# Patient Record
Sex: MALE | Race: WHITE | Employment: OTHER | URBAN - METROPOLITAN AREA
[De-identification: names, ages, dates, MRNs, and addresses within clinical notes are randomized per-mention and may not be internally consistent; named-entity substitution may affect disease eponyms.]

---

## 2019-06-03 ENCOUNTER — OFFICE VISIT (OUTPATIENT)
Dept: FAMILY MEDICINE CLINIC | Facility: CLINIC | Age: 84
End: 2019-06-03
Payer: MEDICARE

## 2019-06-03 VITALS
SYSTOLIC BLOOD PRESSURE: 170 MMHG | TEMPERATURE: 98 F | DIASTOLIC BLOOD PRESSURE: 62 MMHG | HEART RATE: 48 BPM | WEIGHT: 143.63 LBS | RESPIRATION RATE: 20 BRPM

## 2019-06-03 DIAGNOSIS — M25.471 ANKLE EDEMA, BILATERAL: Primary | ICD-10-CM

## 2019-06-03 DIAGNOSIS — R00.1 DECREASED HEART RATE: ICD-10-CM

## 2019-06-03 DIAGNOSIS — Z76.89 ENCOUNTER TO ESTABLISH CARE: ICD-10-CM

## 2019-06-03 DIAGNOSIS — M25.472 ANKLE EDEMA, BILATERAL: Primary | ICD-10-CM

## 2019-06-03 DIAGNOSIS — R01.1 SYSTOLIC MURMUR: ICD-10-CM

## 2019-06-03 DIAGNOSIS — I10 ESSENTIAL HYPERTENSION: ICD-10-CM

## 2019-06-03 PROCEDURE — 99203 OFFICE O/P NEW LOW 30 MIN: CPT | Performed by: FAMILY MEDICINE

## 2019-06-03 RX ORDER — FINASTERIDE 5 MG/1
5 TABLET, FILM COATED ORAL
COMMUNITY
End: 2021-10-28

## 2019-06-03 RX ORDER — ASPIRIN 81 MG/1
81 TABLET ORAL DAILY
COMMUNITY

## 2019-06-03 RX ORDER — ATORVASTATIN CALCIUM 40 MG/1
40 TABLET, FILM COATED ORAL NIGHTLY
COMMUNITY
End: 2021-10-28

## 2019-06-03 RX ORDER — LISINOPRIL 10 MG/1
10 TABLET ORAL
COMMUNITY
End: 2021-10-28

## 2019-06-03 NOTE — PROGRESS NOTES
HPI:    Patient ID: Shola Nguyen is a 80year old male. Patient presents with:  [facility] F/U: edema in feet/afib      HPI  Patient is here with his daughter to establish care. Has pmh significant for HTN, HLD and Afib.   He is here for heard.  Pulmonary/Chest: Effort normal and breath sounds normal.   Musculoskeletal: Normal range of motion. He exhibits no edema or tenderness. Neurological: He has normal strength. No sensory deficit.               ASSESSMENT/PLAN:     Sharyle Argue was seen to

## 2019-06-04 ENCOUNTER — TELEPHONE (OUTPATIENT)
Dept: FAMILY MEDICINE CLINIC | Facility: CLINIC | Age: 84
End: 2019-06-04

## 2019-06-04 NOTE — TELEPHONE ENCOUNTER
[facility] health PT Kaylah Ho because the pt's Hr is low- Heart Rate is 38  Vitals are  /64 O2 98% rr18  No signs of dyspnea, no light headed or dizziness- no edema      Last week HR on  5/29- was 58, and was  59 on 5/30  Weight 140 today  Just t

## 2019-09-20 ENCOUNTER — MED REC SCAN ONLY (OUTPATIENT)
Dept: FAMILY MEDICINE CLINIC | Facility: CLINIC | Age: 84
End: 2019-09-20

## 2020-01-23 ENCOUNTER — TELEPHONE (OUTPATIENT)
Dept: FAMILY MEDICINE CLINIC | Facility: CLINIC | Age: 85
End: 2020-01-23

## 2020-01-23 NOTE — TELEPHONE ENCOUNTER
DAUGHTER CALLED AND ADV THAT SHE NEEDS A NOTE TO BRING TO THE POST OFFICE EXPLAINING PTS CONDITIONS SO THAT THE MAIL PERSON CAN BRING THE MAIL UP TO THE DOOR.     PLEASE CALL AND ADV    THANK YOU

## 2021-04-28 ENCOUNTER — PATIENT OUTREACH (OUTPATIENT)
Dept: FAMILY MEDICINE CLINIC | Facility: CLINIC | Age: 86
End: 2021-04-28

## 2021-05-26 ENCOUNTER — PATIENT OUTREACH (OUTPATIENT)
Dept: FAMILY MEDICINE CLINIC | Facility: CLINIC | Age: 86
End: 2021-05-26

## 2021-07-28 ENCOUNTER — HOSPITAL ENCOUNTER (OUTPATIENT)
Age: 86
Discharge: HOME | End: 2021-07-28
Payer: MEDICARE

## 2021-07-28 VITALS
TEMPERATURE: 98 F | RESPIRATION RATE: 16 BRPM | HEART RATE: 78 BPM | SYSTOLIC BLOOD PRESSURE: 144 MMHG | OXYGEN SATURATION: 97 % | DIASTOLIC BLOOD PRESSURE: 89 MMHG

## 2021-07-28 DIAGNOSIS — R09.82 POSTNASAL DRIP: Primary | ICD-10-CM

## 2021-07-28 PROCEDURE — 99203 OFFICE O/P NEW LOW 30 MIN: CPT | Performed by: NURSE PRACTITIONER

## 2021-07-28 RX ORDER — CETIRIZINE HYDROCHLORIDE 5 MG/1
5 TABLET ORAL DAILY
Qty: 14 TABLET | Refills: 0 | Status: SHIPPED | OUTPATIENT
Start: 2021-07-28 | End: 2021-08-11

## 2021-07-28 RX ORDER — FLUTICASONE PROPIONATE 50 MCG
2 SPRAY, SUSPENSION (ML) NASAL DAILY
Qty: 16 G | Refills: 0 | Status: SHIPPED | OUTPATIENT
Start: 2021-07-28 | End: 2021-08-27

## 2021-08-06 ENCOUNTER — OFFICE VISIT (OUTPATIENT)
Dept: FAMILY MEDICINE CLINIC | Facility: CLINIC | Age: 86
End: 2021-08-06
Payer: MEDICARE

## 2021-08-06 VITALS
TEMPERATURE: 99 F | HEIGHT: 65 IN | WEIGHT: 153 LBS | DIASTOLIC BLOOD PRESSURE: 76 MMHG | HEART RATE: 71 BPM | BODY MASS INDEX: 25.49 KG/M2 | SYSTOLIC BLOOD PRESSURE: 122 MMHG | RESPIRATION RATE: 18 BRPM | OXYGEN SATURATION: 98 %

## 2021-08-06 DIAGNOSIS — H61.21 IMPACTED CERUMEN OF RIGHT EAR: ICD-10-CM

## 2021-08-06 DIAGNOSIS — H92.01 RIGHT EAR PAIN: ICD-10-CM

## 2021-08-06 DIAGNOSIS — R60.0 SWELLING OF RIGHT PAROTID GLAND: Primary | ICD-10-CM

## 2021-08-06 PROBLEM — I44.2 COMPLETE HEART BLOCK (HCC): Status: ACTIVE | Noted: 2019-09-18

## 2021-08-06 PROBLEM — Z95.0 PACEMAKER: Status: ACTIVE | Noted: 2019-09-18

## 2021-08-06 PROBLEM — I48.91 ATRIAL FIBRILLATION WITH SLOW VENTRICULAR RESPONSE (HCC): Status: ACTIVE | Noted: 2019-06-05

## 2021-08-06 PROBLEM — I10 ESSENTIAL HYPERTENSION: Status: ACTIVE | Noted: 2019-06-05

## 2021-08-06 PROCEDURE — 99213 OFFICE O/P EST LOW 20 MIN: CPT | Performed by: FAMILY MEDICINE

## 2021-08-06 RX ORDER — AMOXICILLIN AND CLAVULANATE POTASSIUM 875; 125 MG/1; MG/1
1 TABLET, FILM COATED ORAL 2 TIMES DAILY
Qty: 14 TABLET | Refills: 0 | Status: SHIPPED
Start: 2021-08-06 | End: 2021-08-09

## 2021-08-06 NOTE — PROGRESS NOTES
New patient here for right ear pain x 2 weeks. No discharge. Patient states he has seasonal allergies. Runny nose. No cough/fever.

## 2021-08-06 NOTE — PATIENT INSTRUCTIONS
US of parotid ordered - to evaluate swelling     Rx Augmentin 875 mg twice daily X 7 days to cover for the possibility of infection     Warm compresses at least 3 times per day     If any worsening - fever/ chills / nausea or vomiting GO TO THE ER IMMEDI floss your teeth daily. See your dentist for regular cleanings. Follow-up care  Follow up with your healthcare provider, or as advised. See your healthcare provider for further exams and testing.  If you have been referred to a specialist, make an appoin

## 2021-08-07 NOTE — PROGRESS NOTES
[address] [facility] Family Medicine Office Note  Chief Complaint:   Patient presents with:  New Patient  Ear Pain      HPI:   This is a 80year old male coming in with complaints of R ear discomfort (pointing to the area around the R ear ).   [place] but this following:    Height as of this encounter: 5' 5\" (1.651 m). Weight as of this encounter: 153 lb (69.4 kg). Vital signs reviewed. Appears stated age, well groomed.   Physical Exam     GEN: Not in any acute distress, making good conversation, answering a tablet 0     Sig: Take 1 tablet by mouth 2 (two) times daily for 7 days.        Health Maintenance:  Annual Physical Never done  Zoster Vaccines(1 of 2) Never done  Pneumococcal Vaccine: 65+ Years(1 of 1 - PPSV23) Never done  Annual Depression Screen due on

## 2021-08-09 ENCOUNTER — TELEPHONE (OUTPATIENT)
Dept: FAMILY MEDICINE CLINIC | Facility: CLINIC | Age: 86
End: 2021-08-09

## 2021-08-09 RX ORDER — AMOXICILLIN AND CLAVULANATE POTASSIUM 875; 125 MG/1; MG/1
1 TABLET, FILM COATED ORAL 2 TIMES DAILY
Qty: 14 TABLET | Refills: 0 | Status: SHIPPED | OUTPATIENT
Start: 2021-08-09 | End: 2021-08-16

## 2021-08-09 NOTE — TELEPHONE ENCOUNTER
Patient's EC, Nenita Baker, advised of note below. She verbalized understanding. No further questions at this time.

## 2021-08-09 NOTE — TELEPHONE ENCOUNTER
E-Prescribing Status: Transmission to pharmacy failed (8/6/2021  1:15 PM CDT)      Resend order per protocol  E-Prescribing Status: Receipt confirmed by pharmacy (8/9/2021  9:23 AM CDT)

## 2021-08-09 NOTE — TELEPHONE ENCOUNTER
DAUGHTER CALLED AND ADV THAT SHE THOUGHT THAT THERE WAS SOME MEDICATION THAT WAS SUPPOSE TO BE SENT OVER ON Friday.     LOOKS LIKE SCRIPT NEVER WENT THROUGH--FAILED    amoxicillin-pot clavulanate 875-125 MG Oral Tab    [facility]     THANK YOU

## 2021-08-16 ENCOUNTER — TELEPHONE (OUTPATIENT)
Dept: FAMILY MEDICINE CLINIC | Facility: CLINIC | Age: 86
End: 2021-08-16

## 2021-08-16 DIAGNOSIS — R60.0 SWELLING OF RIGHT PAROTID GLAND: Primary | ICD-10-CM

## 2021-08-16 NOTE — TELEPHONE ENCOUNTER
Sowmya Dhaliwal MD [address] [place] [address] Dr Nato Hernández          Patient's dtr, Boston Sharp, advised of contact information above. She verbalized understanding. No further questions at this time.

## 2021-08-16 NOTE — TELEPHONE ENCOUNTER
I see that he has his [address] scheduled for 8/19/2021, this will help us delineate this further. The concern was infection of the salivary glad vs. Obstructed salivary gland vs. The possibly of this being a tumor.  At this time, considering there is no improveme

## 2021-08-16 NOTE — TELEPHONE ENCOUNTER
Daughter called to ask what she should do. Lola Diehl was given an abx for a swollen jaw. They jaw has not improved and Lola Diehl has finished the abx. Rosa Clifton would like to know if Dr Radha Saleh wants to renew the abx or wait until [facility] visit to address.     Floyd

## 2021-08-16 NOTE — TELEPHONE ENCOUNTER
Patient's dtr, Michelle Vanessa, advised of Doctor's note below. She verbalized understanding and is agreeable for ENT referral. Pt does not have history seeing ENT, would prefer ENT in [facility] or [facility]. Referral Order pending, please review.  Thank luisito

## 2021-08-19 ENCOUNTER — HOSPITAL ENCOUNTER (OUTPATIENT)
Dept: ULTRASOUND IMAGING | Age: 86
Discharge: HOME | End: 2021-08-19
Attending: FAMILY MEDICINE
Payer: MEDICARE

## 2021-08-19 ENCOUNTER — TELEPHONE (OUTPATIENT)
Dept: FAMILY MEDICINE CLINIC | Facility: CLINIC | Age: 86
End: 2021-08-19

## 2021-08-19 DIAGNOSIS — R60.0 SWELLING OF RIGHT PAROTID GLAND: ICD-10-CM

## 2021-08-19 PROCEDURE — 76536 US EXAM OF HEAD AND NECK: CPT | Performed by: FAMILY MEDICINE

## 2021-08-19 NOTE — TELEPHONE ENCOUNTER
----- Message from Geno Hewitt MD sent at 8/19/2021  3:51 PM CDT -----  Called patient's daughter Selin Vergara [facility]) and provided results of the [address] and the need to see ENT asap.  She v/u but informed me that she has an appt to see

## 2021-08-20 ENCOUNTER — TELEPHONE (OUTPATIENT)
Dept: FAMILY MEDICINE CLINIC | Facility: CLINIC | Age: 86
End: 2021-08-20

## 2021-08-20 NOTE — TELEPHONE ENCOUNTER
Realizing that I told Inocencia Echols, patient’s daughter, that he could get his MAW visit done after we figured out details regarding the parotid mass (when she asked me if it was necessary to “follow up tomorrow”).  Please call them and reschedule (appt at 9:40) th

## 2021-08-20 NOTE — TELEPHONE ENCOUNTER
Patient's dtr, Alexx Yusuf, advised of Doctor's note below. She verbalized understanding. No further questions at this time. Future appt made.     Future Appointments   Date Time Provider Rachel Sood   9/13/2021 10:40 AM Christopher Panchal MD [facility]

## 2021-08-28 ENCOUNTER — TELEPHONE (OUTPATIENT)
Dept: FAMILY MEDICINE CLINIC | Facility: CLINIC | Age: 86
End: 2021-08-28

## 2021-08-28 NOTE — TELEPHONE ENCOUNTER
Patient's dtr, Gerhard Harrington, advised of Doctor's note below. She verbalized understanding. No further questions at this time.

## 2021-08-28 NOTE — TELEPHONE ENCOUNTER
Pt's dtr, [place]  Reports that pt is c/o discomfort to right jaw  Inquiring what can be done for swelling and discomfort at this time    She reports pt is c/o discomfort bothering his right ear little bit  She reports pt takes tylenol as needed and last segundo

## 2021-08-28 NOTE — TELEPHONE ENCOUNTER
If in that much pain he should take Tylenol 650 mg every 6-8 hours and taking this schedule, with alternating warm and cold compresses to help with pain. ~ MM

## 2021-08-28 NOTE — TELEPHONE ENCOUNTER
Daughter Mattie Sullivan called pt has some swelling in jaw pt has pain would like a call back     Can be reached at [phone]    Thank you

## 2021-08-30 ENCOUNTER — LAB ENCOUNTER (OUTPATIENT)
Dept: LAB | Age: 86
End: 2021-08-30
Attending: OTOLARYNGOLOGY
Payer: MEDICARE

## 2021-08-30 ENCOUNTER — HOSPITAL ENCOUNTER (OUTPATIENT)
Dept: CT IMAGING | Age: 86
Discharge: HOME | End: 2021-08-30
Attending: OTOLARYNGOLOGY
Payer: MEDICARE

## 2021-08-30 DIAGNOSIS — Z01.818 PREOP EXAMINATION: ICD-10-CM

## 2021-08-30 DIAGNOSIS — K11.8 MASS OF RIGHT PAROTID GLAND: ICD-10-CM

## 2021-08-30 DIAGNOSIS — Z11.59 ENCOUNTER FOR SCREENING FOR OTHER VIRAL DISEASES: ICD-10-CM

## 2021-08-30 LAB — CREAT BLD-MCNC: 1.2 MG/DL

## 2021-08-30 PROCEDURE — 70491 CT SOFT TISSUE NECK W/DYE: CPT | Performed by: OTOLARYNGOLOGY

## 2021-08-30 PROCEDURE — 82565 ASSAY OF CREATININE: CPT

## 2021-08-30 NOTE — PROGRESS NOTES
CT demonstrated the mass. As we discussed in the office, it is very suspicious for cancer. Proceed with FNA.  Should be scheduled already, if not please assist

## 2021-09-01 LAB — SARS-COV-2 RNA RESP QL NAA+PROBE: NOT DETECTED

## 2021-09-02 ENCOUNTER — NURSE ONLY (OUTPATIENT)
Dept: LAB | Facility: HOSPITAL | Age: 86
End: 2021-09-02
Attending: OTOLARYNGOLOGY
Payer: MEDICARE

## 2021-09-02 ENCOUNTER — HOSPITAL ENCOUNTER (OUTPATIENT)
Dept: ULTRASOUND IMAGING | Facility: HOSPITAL | Age: 86
Discharge: HOME | End: 2021-09-02
Attending: OTOLARYNGOLOGY
Payer: MEDICARE

## 2021-09-02 DIAGNOSIS — K11.8 MASS OF RIGHT PAROTID GLAND: ICD-10-CM

## 2021-09-02 PROCEDURE — 88305 TISSUE EXAM BY PATHOLOGIST: CPT | Performed by: OTOLARYNGOLOGY

## 2021-09-02 PROCEDURE — 88341 IMHCHEM/IMCYTCHM EA ADD ANTB: CPT | Performed by: OTOLARYNGOLOGY

## 2021-09-02 PROCEDURE — 76942 ECHO GUIDE FOR BIOPSY: CPT | Performed by: OTOLARYNGOLOGY

## 2021-09-02 PROCEDURE — 88342 IMHCHEM/IMCYTCHM 1ST ANTB: CPT | Performed by: OTOLARYNGOLOGY

## 2021-09-02 PROCEDURE — 42400 BIOPSY OF SALIVARY GLAND: CPT | Performed by: OTOLARYNGOLOGY

## 2021-09-02 NOTE — PROCEDURES
[facility]  Procedure Note    Mayra Alegria Patient Status:  Outpatient    3/24/1926 MRN [id number]   Location [address] [place] [address] Attending Damaris Huntley MD   Hosp Day # 0 PCP Geno Key MD     Procedure: US guided pa

## 2021-09-08 ENCOUNTER — TELEPHONE (OUTPATIENT)
Dept: FAMILY MEDICINE CLINIC | Facility: CLINIC | Age: 86
End: 2021-09-08

## 2021-09-08 NOTE — TELEPHONE ENCOUNTER
Geovanny of Dr. Manjarrez Janett office called to notify PCP's office of the following:    Dr. Nikko Wyatt (ENT) placed orders for referral Radiation Oncology, order for PET scan, and referral order for Dr. Crystal Grimaldo of [facility] for second opinion.     She repor

## 2021-09-08 NOTE — PROGRESS NOTES
Spoke to daughter and gave her numbers to radiology(for disc and pet scan), oncology(for appt) and for Dr. Joan Silva (for appt. Also entered referral for Dr. Joan Silva. Sherin Neumann

## 2021-09-08 NOTE — PROGRESS NOTES
Discussed with patient and daughter on phone yesterday. 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 [place], please assist in scheduling.      Adrianna, I would also like patient to get second opinion on treatment wit

## 2021-09-13 ENCOUNTER — HOSPITAL ENCOUNTER (OUTPATIENT)
Age: 86
Discharge: HOME | End: 2021-09-13
Payer: MEDICARE

## 2021-09-13 ENCOUNTER — TELEPHONE (OUTPATIENT)
Dept: FAMILY MEDICINE CLINIC | Facility: CLINIC | Age: 86
End: 2021-09-13

## 2021-09-13 VITALS
OXYGEN SATURATION: 96 % | TEMPERATURE: 98 F | HEART RATE: 82 BPM | DIASTOLIC BLOOD PRESSURE: 83 MMHG | SYSTOLIC BLOOD PRESSURE: 149 MMHG | RESPIRATION RATE: 16 BRPM

## 2021-09-13 DIAGNOSIS — S51.802A OPEN WOUND OF LEFT FOREARM, INITIAL ENCOUNTER: Primary | ICD-10-CM

## 2021-09-13 DIAGNOSIS — H02.401 PTOSIS OF RIGHT EYELID: ICD-10-CM

## 2021-09-13 PROCEDURE — 99204 OFFICE O/P NEW MOD 45 MIN: CPT | Performed by: PHYSICIAN ASSISTANT

## 2021-09-13 RX ORDER — TETRACAINE HYDROCHLORIDE 5 MG/ML
1 SOLUTION OPHTHALMIC ONCE
Status: COMPLETED | OUTPATIENT
Start: 2021-09-13 | End: 2021-09-13

## 2021-09-13 NOTE — TELEPHONE ENCOUNTER
Daughter called wanting to get her father in for an appointment this week if possible. Cut on left arm, may be infected.     Please advise- thank you

## 2021-09-13 NOTE — TELEPHONE ENCOUNTER
Pt's dtr, Belakshmi Parents reports cut to left arm, forearm - from metal screen door at house  She reports as \"swollen,\" \"not healing well\"   She reports little spot that could be infected, yellow - maybe pus  Nothing was really oozing, but was covered with bright

## 2021-09-13 NOTE — ED PROVIDER NOTES
Patient Seen in: Immediate [address] [place] [address]      History   Patient presents with:  Cellulitis    Stated Complaint: left arm lac    Subjective:   HPI    27-year-old male presents the immediate care for concerns about infection.   Patient states 1 week ago the s 149/83   Pulse 82   Resp 16   Temp 97.8 °F (36.6 °C)   Temp src Temporal   SpO2 96 %   O2 Device        Current:/83   Pulse 82   Temp 97.8 °F (36.6 °C) (Temporal)   Resp 16   SpO2 96%         Physical Exam  Vitals and nursing note reviewed.    Constit or sleep the area where he sees the swelling would be the dependent area. I do believe that the skin tear caused the excess swelling to go to the palmar side of his arm and it is collecting in his skin, essentially third spacing in the arm.  Patient was con

## 2021-09-13 NOTE — TELEPHONE ENCOUNTER
Patient will likely need to be seen in immediate care today. Follow up with me to assess response to antibiotics in the next few days.  ~ MM

## 2021-09-13 NOTE — TELEPHONE ENCOUNTER
Patient's dtr, Bren Young, advised of Doctor's note below. She verbalized understanding, asking if [facility] okay - she was advised that they may try [facility] first, and if needed- they would send to . She v/u.

## 2021-09-13 NOTE — ED INITIAL ASSESSMENT (HPI)
Pt cut his left forearm on a metal screen door last week. Wound appears red and has dried yellow drainage in it. Area is tender.

## 2021-09-20 ENCOUNTER — TELEPHONE (OUTPATIENT)
Dept: FAMILY MEDICINE CLINIC | Facility: CLINIC | Age: 86
End: 2021-09-20

## 2021-09-20 NOTE — TELEPHONE ENCOUNTER
Future Appointments   Date Time Provider Rachel Sood   9/22/2021 12:40 PM Geno Reed MD [facility] Vamsi Stack

## 2021-09-20 NOTE — TELEPHONE ENCOUNTER
A patient has a laceration on the right forearm for a few weeks. The patient did not believe it was healing right so he went to urgent care on 9/13/21. The patient did not receive stitches or medication and was told it looked like it was healing well.    Pa

## 2021-09-22 ENCOUNTER — OFFICE VISIT (OUTPATIENT)
Dept: FAMILY MEDICINE CLINIC | Facility: CLINIC | Age: 86
End: 2021-09-22
Payer: MEDICARE

## 2021-09-22 VITALS
HEIGHT: 65 IN | DIASTOLIC BLOOD PRESSURE: 72 MMHG | WEIGHT: 154.5 LBS | TEMPERATURE: 98 F | SYSTOLIC BLOOD PRESSURE: 120 MMHG | OXYGEN SATURATION: 98 % | HEART RATE: 76 BPM | RESPIRATION RATE: 18 BRPM | BODY MASS INDEX: 25.74 KG/M2

## 2021-09-22 DIAGNOSIS — H01.002 BLEPHARITIS OF RIGHT LOWER EYELID, UNSPECIFIED TYPE: ICD-10-CM

## 2021-09-22 DIAGNOSIS — T14.8XXA FRICTION BLISTER: ICD-10-CM

## 2021-09-22 DIAGNOSIS — Z51.89 VISIT FOR WOUND CHECK: Primary | ICD-10-CM

## 2021-09-22 PROCEDURE — 99213 OFFICE O/P EST LOW 20 MIN: CPT | Performed by: FAMILY MEDICINE

## 2021-09-22 RX ORDER — CIPROFLOXACIN HYDROCHLORIDE 3.5 MG/ML
2 SOLUTION/ DROPS TOPICAL EVERY 8 HOURS
Qty: 5 ML | Refills: 0 | Status: SHIPPED | OUTPATIENT
Start: 2021-09-22 | End: 2021-09-29

## 2021-09-22 NOTE — PROGRESS NOTES
[facility] Family Medicine Office Note  Chief Complaint:   Patient presents with: Follow - Up: IC on 9/13/21. Laceration/Eye issue.       HPI:   This is a 80year old male coming in for [address] / LACERATION OF THE L forearm   R ey conversation, answering appropriately   SKIN: No pallor, no erythema, no cyanosis, warm and dry, picture of L forearm as below. No swelling and no signs of cellulitis at this time.    Eyes: wnl, normal conjunctiva, R eye lower eyelid eversion and erythema, 06/03/2020    Patient/Caregiver Education: Patient/Caregiver Education: There are no barriers to learning. Medical education done. Outcome: Patient verbalizes understanding.  Patient is notified to call with any questions, complications, allergies, or wor

## 2021-09-22 NOTE — PROGRESS NOTES
81 yo male here for f/u IC on 9/13/21. Laceration to left are. Patient states arm healing well, a bump has formed under the skin. No pain to touch (bump). Eye issue has not resolved. Patient states he feels like he has something in it.  He is not doing elaine

## 2021-09-24 PROBLEM — C76.0 SQUAMOUS CELL CARCINOMA OF HEAD AND NECK (HCC): Status: ACTIVE | Noted: 2021-09-24

## 2021-09-28 ENCOUNTER — ORDER TRANSCRIPTION (OUTPATIENT)
Dept: ADMINISTRATIVE | Facility: HOSPITAL | Age: 86
End: 2021-09-28

## 2021-09-28 DIAGNOSIS — Z01.818 PREOP EXAMINATION: Primary | ICD-10-CM

## 2021-09-28 DIAGNOSIS — Z11.59 ENCOUNTER FOR SCREENING FOR OTHER VIRAL DISEASES: ICD-10-CM

## 2021-10-02 ENCOUNTER — LAB ENCOUNTER (OUTPATIENT)
Dept: LAB | Age: 86
End: 2021-10-02
Attending: RADIOLOGY
Payer: MEDICARE

## 2021-10-02 DIAGNOSIS — Z11.59 ENCOUNTER FOR SCREENING FOR OTHER VIRAL DISEASES: ICD-10-CM

## 2021-10-02 DIAGNOSIS — Z01.818 PREOP EXAMINATION: ICD-10-CM

## 2021-10-05 ENCOUNTER — HOSPITAL ENCOUNTER (OUTPATIENT)
Dept: GENERAL RADIOLOGY | Facility: HOSPITAL | Age: 86
Discharge: HOME | End: 2021-10-05
Attending: RADIOLOGY
Payer: MEDICARE

## 2021-10-05 DIAGNOSIS — C76.0 SQUAMOUS CELL CARCINOMA OF HEAD AND NECK (HCC): ICD-10-CM

## 2021-10-05 PROCEDURE — 92611 MOTION FLUOROSCOPY/SWALLOW: CPT

## 2021-10-05 PROCEDURE — 74230 X-RAY XM SWLNG FUNCJ C+: CPT | Performed by: RADIOLOGY

## 2021-10-05 NOTE — PROGRESS NOTES
ADULT VIDEOFLUOROSCOPIC SWALLOWING STUDY    Admission Date: 10/5/2021  Evaluation Date: 10/05/21  Radiologist: Dr. Shelly Hanson: Soft/ Easy to chew  Diet Recommendations - Liquids:  Thin Liquids  Compensatory Strat function. THIN LIQUIDS  Method of Presentation: Teaspoon; Cup; Straw  Oral Phase of Swallow (VFSS - Thin Liquids): Impaired  Bolus Retrieval (VFSS - Thin Liquids): Intact  Bilabial Seal (VFSS - Thin Liquids):  Impaired  Bolus Formation (VFSS - Thin Liqui outpatient dysphagia therapy       Thank you for your referral.   If you have any questions, please contact Dahlia Noyola, [address] [facility], [facility] [address] CCC-SLP/L, pager [phone]  Speech-LanguagePathologist

## 2021-10-28 ENCOUNTER — OFFICE VISIT (OUTPATIENT)
Dept: FAMILY MEDICINE CLINIC | Facility: CLINIC | Age: 86
End: 2021-10-28
Payer: MEDICARE

## 2021-10-28 VITALS
TEMPERATURE: 99 F | RESPIRATION RATE: 16 BRPM | DIASTOLIC BLOOD PRESSURE: 62 MMHG | OXYGEN SATURATION: 97 % | HEART RATE: 60 BPM | SYSTOLIC BLOOD PRESSURE: 118 MMHG

## 2021-10-28 DIAGNOSIS — Z00.00 ENCOUNTER FOR ANNUAL HEALTH EXAMINATION: Primary | ICD-10-CM

## 2021-10-28 PROCEDURE — 80053 COMPREHEN METABOLIC PANEL: CPT | Performed by: NURSE PRACTITIONER

## 2021-10-28 PROCEDURE — 85025 COMPLETE CBC W/AUTO DIFF WBC: CPT | Performed by: NURSE PRACTITIONER

## 2021-10-28 PROCEDURE — 80061 LIPID PANEL: CPT | Performed by: NURSE PRACTITIONER

## 2021-10-28 PROCEDURE — G0439 PPPS, SUBSEQ VISIT: HCPCS | Performed by: NURSE PRACTITIONER

## 2021-10-28 RX ORDER — ATORVASTATIN CALCIUM 40 MG/1
40 TABLET, FILM COATED ORAL NIGHTLY
Qty: 90 TABLET | Refills: 1 | Status: SHIPPED | OUTPATIENT
Start: 2021-10-28

## 2021-10-28 RX ORDER — FINASTERIDE 5 MG/1
5 TABLET, FILM COATED ORAL DAILY
Qty: 90 TABLET | Refills: 1 | Status: SHIPPED | OUTPATIENT
Start: 2021-10-28

## 2021-10-28 RX ORDER — LISINOPRIL 10 MG/1
10 TABLET ORAL DAILY
Qty: 90 TABLET | Refills: 1 | Status: SHIPPED | OUTPATIENT
Start: 2021-10-28

## 2021-10-28 RX ORDER — ACETAMINOPHEN 160 MG
2000 TABLET,DISINTEGRATING ORAL DAILY
COMMUNITY

## 2021-10-28 NOTE — PROGRESS NOTES
HPI:   Salvador Villalobos is a 80year old male who presents for a Medicare Initial Annual Wellness visit (Once after 12 month Medicare anniversary) . Lives alone  Here with merry his daughter   Dr. Adler Standard- cardiologist follows up once a year.  Has a pa functional status. Difficulty walking?: Yes   He has problems with Daily Activities based on screening of functional status. Problems with daily activities? : Yes   He has problems with Memory based on screening of functional status. Memory Problems? daily.  lisinopril 10 MG Oral Tab, Take 10 mg by mouth. finasteride 5 MG Oral Tab, Take 5 mg by mouth. atorvastatin 40 MG Oral Tab, Take 40 mg by mouth nightly. aspirin 81 MG Oral Tab, Take 81 mg by mouth daily.        MEDICAL INFORMATION:   He  has a pa Acuity: No (unable to do due to wheelchair, and problems with both eyes)      General Appearance:  Alert, cooperative, no distress, appears stated age, he is in a wheelchair    Head:  Normocephalic, right side cheek with some swelling.  Doing radiation endy poor?: No  How does the patient maintain a good energy level?: Other  How would you describe your daily physical activity?: Light  How would you describe your current health state?: Poor  How do you maintain positive mental well-being?: Visiting Family preventive care reminders to display for this patient.    Immunizations    Influenza Covered once per flu season  Please get every year -  Influenza Vaccine(1) due on 10/01/2021    Pneumococcal Each vaccine ([id number] & [id number]) covered once after 65 Pr

## 2021-10-28 NOTE — PATIENT INSTRUCTIONS
[facility] SCREENING SCHEDULE   Tests on this list are recommended by your physician but may not be covered, or covered at this frequency, by your insurer. Please check with your insurance carrier before scheduling to verify coverage.    PREVEN 09/27/2012     Pneumococcal Vaccination(1 of 4 - PCV13) Never done    Hepatitis B One screening covered for patients with certain risk factors   -  No recommendations at this time    Tetanus Toxoid Not covered by Medicare Part B unless medically necessary

## 2021-11-10 ENCOUNTER — APPOINTMENT (OUTPATIENT)
Dept: CT IMAGING | Facility: HOSPITAL | Age: 86
DRG: 196 | End: 2021-11-10
Attending: EMERGENCY MEDICINE
Payer: MEDICARE

## 2021-11-10 ENCOUNTER — HOSPITAL ENCOUNTER (OUTPATIENT)
Age: 86
Discharge: ANOTHER HOSPITAL | DRG: 196 | End: 2021-11-10
Payer: MEDICARE

## 2021-11-10 ENCOUNTER — HOSPITAL ENCOUNTER (INPATIENT)
Facility: HOSPITAL | Age: 86
LOS: 3 days | Discharge: HOME | DRG: 196 | End: 2021-11-13
Attending: EMERGENCY MEDICINE | Admitting: STUDENT IN AN ORGANIZED HEALTH CARE EDUCATION/TRAINING PROGRAM
Payer: MEDICARE

## 2021-11-10 ENCOUNTER — APPOINTMENT (OUTPATIENT)
Dept: GENERAL RADIOLOGY | Facility: HOSPITAL | Age: 86
DRG: 196 | End: 2021-11-10
Attending: EMERGENCY MEDICINE
Payer: MEDICARE

## 2021-11-10 VITALS
TEMPERATURE: 99 F | HEART RATE: 64 BPM | DIASTOLIC BLOOD PRESSURE: 71 MMHG | RESPIRATION RATE: 32 BRPM | OXYGEN SATURATION: 95 % | SYSTOLIC BLOOD PRESSURE: 136 MMHG

## 2021-11-10 DIAGNOSIS — I50.9 ACUTE ON CHRONIC CONGESTIVE HEART FAILURE, UNSPECIFIED HEART FAILURE TYPE (HCC): Primary | ICD-10-CM

## 2021-11-10 DIAGNOSIS — R06.00 DYSPNEA, UNSPECIFIED TYPE: ICD-10-CM

## 2021-11-10 DIAGNOSIS — R06.00 DYSPNEA, UNSPECIFIED TYPE: Primary | ICD-10-CM

## 2021-11-10 DIAGNOSIS — C76.0 SQUAMOUS CELL CARCINOMA OF HEAD AND NECK (HCC): ICD-10-CM

## 2021-11-10 DIAGNOSIS — R06.82 TACHYPNEA: ICD-10-CM

## 2021-11-10 DIAGNOSIS — Z11.52 ENCOUNTER FOR SCREENING FOR COVID-19: ICD-10-CM

## 2021-11-10 PROBLEM — R73.9 HYPERGLYCEMIA: Status: ACTIVE | Noted: 2021-11-10

## 2021-11-10 PROBLEM — D64.9 ANEMIA: Status: ACTIVE | Noted: 2021-11-10

## 2021-11-10 PROBLEM — R79.89 AZOTEMIA: Status: ACTIVE | Noted: 2021-11-10

## 2021-11-10 PROBLEM — E87.1 HYPONATREMIA: Status: ACTIVE | Noted: 2021-11-10

## 2021-11-10 PROCEDURE — 99223 1ST HOSP IP/OBS HIGH 75: CPT | Performed by: STUDENT IN AN ORGANIZED HEALTH CARE EDUCATION/TRAINING PROGRAM

## 2021-11-10 PROCEDURE — 71045 X-RAY EXAM CHEST 1 VIEW: CPT | Performed by: EMERGENCY MEDICINE

## 2021-11-10 PROCEDURE — 93000 ELECTROCARDIOGRAM COMPLETE: CPT | Performed by: PHYSICIAN ASSISTANT

## 2021-11-10 PROCEDURE — 99205 OFFICE O/P NEW HI 60 MIN: CPT | Performed by: PHYSICIAN ASSISTANT

## 2021-11-10 PROCEDURE — U0002 COVID-19 LAB TEST NON-CDC: HCPCS | Performed by: PHYSICIAN ASSISTANT

## 2021-11-10 PROCEDURE — 71275 CT ANGIOGRAPHY CHEST: CPT | Performed by: EMERGENCY MEDICINE

## 2021-11-10 RX ORDER — AMOXICILLIN AND CLAVULANATE POTASSIUM 400; 57 MG/5ML; MG/5ML
880 POWDER, FOR SUSPENSION ORAL 2 TIMES DAILY
COMMUNITY
Start: 2021-11-07 | End: 2021-11-16

## 2021-11-10 RX ORDER — ONDANSETRON 2 MG/ML
4 INJECTION INTRAMUSCULAR; INTRAVENOUS EVERY 6 HOURS PRN
Status: DISCONTINUED | OUTPATIENT
Start: 2021-11-10 | End: 2021-11-13

## 2021-11-10 RX ORDER — FINASTERIDE 5 MG/1
5 TABLET, FILM COATED ORAL DAILY
Status: DISCONTINUED | OUTPATIENT
Start: 2021-11-10 | End: 2021-11-13

## 2021-11-10 RX ORDER — HEPARIN SODIUM 5000 [USP'U]/ML
5000 INJECTION, SOLUTION INTRAVENOUS; SUBCUTANEOUS EVERY 8 HOURS SCHEDULED
Status: DISCONTINUED | OUTPATIENT
Start: 2021-11-10 | End: 2021-11-13

## 2021-11-10 RX ORDER — METOCLOPRAMIDE HYDROCHLORIDE 5 MG/ML
5 INJECTION INTRAMUSCULAR; INTRAVENOUS EVERY 8 HOURS PRN
Status: DISCONTINUED | OUTPATIENT
Start: 2021-11-10 | End: 2021-11-13

## 2021-11-10 RX ORDER — ACETAMINOPHEN 325 MG/1
650 TABLET ORAL EVERY 6 HOURS PRN
Status: DISCONTINUED | OUTPATIENT
Start: 2021-11-10 | End: 2021-11-13

## 2021-11-10 RX ORDER — FUROSEMIDE 10 MG/ML
40 INJECTION INTRAMUSCULAR; INTRAVENOUS ONCE
Status: COMPLETED | OUTPATIENT
Start: 2021-11-10 | End: 2021-11-10

## 2021-11-10 RX ORDER — BISACODYL 10 MG
10 SUPPOSITORY, RECTAL RECTAL
Status: DISCONTINUED | OUTPATIENT
Start: 2021-11-10 | End: 2021-11-13

## 2021-11-10 RX ORDER — ASPIRIN 81 MG/1
81 TABLET ORAL DAILY
Status: DISCONTINUED | OUTPATIENT
Start: 2021-11-10 | End: 2021-11-13

## 2021-11-10 RX ORDER — POLYETHYLENE GLYCOL 3350 17 G/17G
17 POWDER, FOR SOLUTION ORAL DAILY PRN
Status: DISCONTINUED | OUTPATIENT
Start: 2021-11-10 | End: 2021-11-13

## 2021-11-10 RX ORDER — ATORVASTATIN CALCIUM 40 MG/1
40 TABLET, FILM COATED ORAL NIGHTLY
Status: DISCONTINUED | OUTPATIENT
Start: 2021-11-10 | End: 2021-11-13

## 2021-11-10 RX ORDER — SODIUM PHOSPHATE, DIBASIC AND SODIUM PHOSPHATE, MONOBASIC 7; 19 G/133ML; G/133ML
1 ENEMA RECTAL ONCE AS NEEDED
Status: DISCONTINUED | OUTPATIENT
Start: 2021-11-10 | End: 2021-11-13

## 2021-11-10 RX ORDER — AMOXICILLIN AND CLAVULANATE POTASSIUM 400; 57 MG/5ML; MG/5ML
875 POWDER, FOR SUSPENSION ORAL 2 TIMES DAILY
Status: DISCONTINUED | OUTPATIENT
Start: 2021-11-10 | End: 2021-11-13

## 2021-11-10 NOTE — ED PROVIDER NOTES
Patient Seen in: [facility] Emergency Department      History   Patient presents with:  Difficulty Breathing  Covid    Stated Complaint: sob    Subjective:   HPI    Patient is a 42-year-old gentleman with a history of squamous cell carcinoma of the abdomen: Soft and nontender throughout. No rebound or guarding  Extremities: Trace edema  Skin: No rashes, no pallor  Neuro: Awake oriented ×3.   Nonfocal.  Good strength throughout    ED Course     Labs Reviewed   COMP METABOLIC PANEL (14) - Abnormal; Not orders. BLOOD CULTURE   BLOOD CULTURE     EKG    Rate, intervals and axes as noted on EKG Report. Rate: 72  Rhythm: Ventricular paced rhythm  Reading: Paced rhythm.   No acute ST-T wave changes              Chest x-ray: Perihilar interstitial opacities s

## 2021-11-10 NOTE — H&P
[facility]  History and Physical     Mayra Terezaak Patient Status:  Emergency    3/24/1926 MRN [id number]   Location [address] [facility] Attending Isidra Tapia MD   Hosp Day # 0 PCP Geno Key MD     Ch tablet, Rfl: 1  aspirin 81 MG Oral Tab, Take 81 mg by mouth daily. , Disp: , Rfl:         Review of Systems:   A comprehensive 14 point review of systems was completed. Pertinent positives and negatives noted in the HPI.     Physical Exam:    BP (!) 142/9 hours. Cardiac  Recent Labs   Lab 11/10/21  1436   TROP 0.065*   PBNP 5,131*       Creatinine Kinase  No results for input(s): CK in the last 168 hours.     Inflammatory Markers  Recent Labs   Lab 11/10/21  1436   DDIMER 3.20*       Imaging: Imaging data

## 2021-11-10 NOTE — CONSULTS
[place]  Consultation    Colleen Chaudhari Patient Status:  Emergency    3/24/1926 MRN [id number]   Location [facility] [facility] Attending Karsten Olvera MD   Hosp Day # 0 PCP Geno Carvajal MD       Reason for c hypertension    • Heart disease     Wears a pacemaker   • Pacemaker      History reviewed. No pertinent surgical history. Family History   Problem Relation Age of Onset   • Cancer Sister       reports that he has never smoked.  He has never used smokeless

## 2021-11-10 NOTE — ED PROVIDER NOTES
Patient Seen in: Immediate [address] [place] [address]      History   Patient presents with:  Cough/URI  Difficulty Breathing    Stated Complaint: patient is a current cancer patient having shortness of breathe     Subjective:   HPI    40-year-old male with a history of Rate and Rhythm: Normal rate. Pulmonary:      Effort: Tachypnea present. Breath sounds: Normal breath sounds. Musculoskeletal:      Cervical back: Normal range of motion and neck supple. Skin:     General: Skin is warm and dry.       [place] Claudia

## 2021-11-11 ENCOUNTER — APPOINTMENT (OUTPATIENT)
Dept: CV DIAGNOSTICS | Facility: HOSPITAL | Age: 86
DRG: 196 | End: 2021-11-11
Attending: NURSE PRACTITIONER
Payer: MEDICARE

## 2021-11-11 PROCEDURE — 93306 TTE W/DOPPLER COMPLETE: CPT | Performed by: NURSE PRACTITIONER

## 2021-11-11 PROCEDURE — 99232 SBSQ HOSP IP/OBS MODERATE 35: CPT | Performed by: INTERNAL MEDICINE

## 2021-11-11 RX ORDER — MULTIPLE VITAMINS W/ MINERALS TAB 9MG-400MCG
1 TAB ORAL DAILY
Status: DISCONTINUED | OUTPATIENT
Start: 2021-11-11 | End: 2021-11-13

## 2021-11-11 RX ORDER — FUROSEMIDE 20 MG/1
20 TABLET ORAL DAILY
Qty: 30 TABLET | Refills: 3 | Status: SHIPPED | OUTPATIENT
Start: 2021-11-11

## 2021-11-11 RX ORDER — FUROSEMIDE 20 MG/1
20 TABLET ORAL DAILY
Status: DISCONTINUED | OUTPATIENT
Start: 2021-11-11 | End: 2021-11-13

## 2021-11-11 RX ORDER — ECHINACEA PURPUREA EXTRACT 125 MG
1 TABLET ORAL
Status: DISCONTINUED | OUTPATIENT
Start: 2021-11-11 | End: 2021-11-13

## 2021-11-11 NOTE — PROGRESS NOTES
NURSING ADMISSION NOTE      Patient admitted via Cart  Oriented to room. Safety precautions initiated. Bed in low position. Call light in reach. Admission navigator complete. Assumed care at [address] [place] [address]. A&Ox4, RA, V paced on tele, afebrile, VSS.  Regul

## 2021-11-11 NOTE — ED QUICK NOTES
Orders for admission, patient is aware of plan and ready to go upstairs. Any questions, please call ED RN Marva Cabrera  at extension [phone]. Vaccinated?  YES   Type of COVID test sent: Rapid at the [place] Suspicion level: low      Titratable drug

## 2021-11-11 NOTE — PHYSICAL THERAPY NOTE
PHYSICAL THERAPY EVALUATION - INPATIENT     Room Number: 525/449-W  Evaluation Date: 11/11/2021  Type of Evaluation: Initial  Physician Order: PT Eval and Treat    Presenting Problem: shortness of breath   Co-Morbidities : cancer, radiation, pacemake independent with ADLs, lives alone, drives, and ambulates with a walker. Pt and pt's dtr report that he no longer feels he is safe to live alone, has had a decline in health, has had multiple falls, and needs increased assistance with daily tasks.  Pt's spo Moving from lying on back to sitting on the side of the bed?: A Little   How much help from another person does the patient currently need. ..    Help from Another: Moving to and from a bed to a chair (including a wheelchair)?: A Little   Help from Another: balance. Pt abandoned walker prior to sitting on the toilet and prior to going to the sink, pt needed cues to keep walker with him. Pt had one LOB requiring assist to correct when he abandoned the walker heading toward the sink. Pt returned to chair.  Pt re 3-5x/week  Number of Visits to Meet Established Goals: 4      CURRENT GOALS    Goal #1 Patient is able to demonstrate supine - sit EOB @ level: modified independent     Goal #2 Patient is able to demonstrate transfers Sit to/from Stand at assistance level:

## 2021-11-11 NOTE — PROGRESS NOTES
[address] [facility] [employer] [facility]  Cardiology Progress Note    Meena Starch Patient Status:  Inpatient    3/24/1926 MRN [id number]   [facility] 5NW-A Attending Galina Dyer MD   Hosp Day # 1 PCP Geno Funes MD       Subjective:  No ac oxygen requirements clinically. -start lasix 20mg PO QD    Please call with questions.        Level of care: Minerva Fields MD  Interventional Cardiology  [address] [place] [address]    11/11/2021  1:55 PM

## 2021-11-11 NOTE — DIETARY MALNUTRITION NOTE
[facility]  NUTRITION ASSESSMENT    Pt meets severe malnutrition criteria.     CRITERIA FOR MALNUTRITION DIAGNOSIS:  Criteria for severe malnutrition diagnosis: chronic illness related to wt loss greater than 5% in 1 month, energy intake less than 75% Encounters:  11/10/21 : 64.6 kg (142 lb 8 oz)  11/05/21 : 64.8 kg (142 lb 14.4 oz)  09/22/21 : 70.1 kg (154 lb 8 oz)  08/24/21 : 69.9 kg (154 lb)  08/06/21 : 69.4 kg (153 lb)  06/03/19 : 65.1 kg (143 lb 9.6 oz)       NUTRITION:  Diet: Orders Placed This En

## 2021-11-11 NOTE — PROGRESS NOTES
[facility]  Progress Note    Gildardo Barragan Patient Status:  Inpatient    3/24/1926 MRN [id number]   [facility] 5NW-A Attending Umair Brown MD   Hosp Day # 1 PCP Geno Evans MD     CC: Shortness of breath    [address] [place] [address] 11/11/2021    CO2 30.0 11/11/2021     11/11/2021    CA 9.6 11/11/2021       Imaging:   CT ANGIOGRAPHY, CHEST (CPT=71275)       COMPARISON:  None.       INDICATIONS:  sob       TECHNIQUE:  IV contrast-enhanced multislice CT angiography is performed t chest radiograph was obtained.       COMPARISON:  None.       INDICATIONS:  sob       PATIENT STATED HISTORY: (As transcribed by Technologist)  Pt. with difficulty breathing.            FINDINGS:  Low lung volumes.  Magnified and enlarged heart with pacema Oral, BID        ASSESSMENT / PLAN:     1. Shortness of breath due to possible pulmonary fibrosis, pulmonary hypertension  1. CTA chest negative for PE. Showed peripheral interstitial opacities consistent with pulmonary fibrosis  2. Seen by cardiology.   3 in H+P. Based on patients current state of illness, I anticipate that, after discharge, patient will require TBD.         Juanita Rankin MD  11/11/2021

## 2021-11-11 NOTE — PLAN OF CARE
Assumed patient care at 0730 this AM. Patient A&O x4, forgetful at times and not the best historian. Right facial dropp r/t radiation tx, right eye red- eye gtts ordered. Dressing at right ear cdi. Receiving PO Augmentin BID for cellulitis.  SPO2 maintained on type and severity of pain and evaluate response  - Implement non-pharmacological measures as appropriate and evaluate response  - Consider cultural and social influences on pain and pain management  - Manage/alleviate anxiety  - Utilize distraction and/ ability to be responsible for managing their own health  - Refer to Case Management Department for coordinating discharge planning if the patient needs post-hospital services based on physician/LIP order or complex needs related to functional status, cogni

## 2021-11-11 NOTE — SLP NOTE
ADULT SWALLOWING EVALUATION    ASSESSMENT    ASSESSMENT/OVERALL IMPRESSION:  Patient seen for swallowing evaluation due to history of altered diet consistency. Patient presented to hospital due to SOB.   Patient with pertinent medical history including HTN swallowing during this session with aforementioned po trials. Discussed above with the patient who agreed with my assessment that he appears to be managing as well as possible considering deficits noted.   He appears to have good judgment of which foods 11/10/2021 at 3:32 PM       Finalized by (CST): Elaine Blunt MD on 11/10/2021 at 3:33 PM       SUBJECTIVE       OBJECTIVE   ORAL MOTOR EXAMINATION  Dentition:  (has upper and lower dentures which no longer fit)  Symmetry: Reduced right facial  Strength: R level of isolation including gloves, eyewear, P100 mask and gown. Patient was not masked.

## 2021-11-12 PROCEDURE — 99232 SBSQ HOSP IP/OBS MODERATE 35: CPT | Performed by: INTERNAL MEDICINE

## 2021-11-12 RX ORDER — AZELASTINE 1 MG/ML
1 SPRAY, METERED NASAL 2 TIMES DAILY
Status: DISCONTINUED | OUTPATIENT
Start: 2021-11-12 | End: 2021-11-13

## 2021-11-12 RX ORDER — FLUTICASONE PROPIONATE 50 MCG
1 SPRAY, SUSPENSION (ML) NASAL 2 TIMES DAILY
Status: DISCONTINUED | OUTPATIENT
Start: 2021-11-12 | End: 2021-11-13

## 2021-11-12 NOTE — CONSULTS
[facility]  Report of Consultation    Reina Bass Patient Status:  Inpatient    3/24/1926 MRN [id number]   [facility] 5NW-A Attending Shira Dior MD   Hosp Day # 2 PCP Geno Hewitt MD     Reason for [facility] 0900  Benadryl/Maalox/Lidocaine 1:1:1 solution, Take 5-10 mL by mouth TID AC&HS. Take this 15-20 minutes prior to meals. , Disp: 300 mL, Rfl: 0, Past Month at 0900  Vitamin D3, Cholecalciferol, 50 MCG (2000 UT) Oral Cap, Take 2,000 Units by mouth daily. Amber Villafana distress. Head: Normocephalic-right eye droop with right facial droop   Eyes/Nose: Pupils seem clear   Throat: Lips, mucosa, and tongue normal.  No thrush noted.   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

## 2021-11-12 NOTE — PLAN OF CARE
Patient alert and oriented x4, can be forgetful. Up with assist and walker. V-paced on tele. VSS. Maintaining o2 sats >90% on RA. Voids/sometimes incontinent, pullups on. Tolerating diet. Dressing change to right ear. Denies pain.  Safety precautions put in monitor pain and request assistance  - Assess pain using appropriate pain scale  - Administer analgesics based on type and severity of pain and evaluate response  - Implement non-pharmacological measures as appropriate and evaluate response  - Consider cul post-discharge preferences of patient/family/discharge partner  - Complete POLST form as appropriate  - Assess patient's ability to be responsible for managing their own health  - Refer to Case Management Department for coordinating discharge planning if t

## 2021-11-12 NOTE — PLAN OF CARE
Pt A&Ox4.  on RA. V-paced on tele. VSS, afebrile. No c/o pain. No n/v/d. Up with SBA and walker. Cardiac diet, good appetite. Dressing to ear is C/D/I, changed. Pt and daughter updated on plan of care, all needs met at this time.        Problem: Patient/ request assistance  - Assess pain using appropriate pain scale  - Administer analgesics based on type and severity of pain and evaluate response  - Implement non-pharmacological measures as appropriate and evaluate response  - Consider cultural and social of patient/family/discharge partner  - Complete POLST form as appropriate  - Assess patient's ability to be responsible for managing their own health  - Refer to Case Management Department for coordinating discharge planning if the patient needs post-hospi

## 2021-11-12 NOTE — PROGRESS NOTES
11/11/21 1848   Provider Notification   Reason for Communication New consult   Provider Name Luz Grimaldo MD  [facility] on call)   Method of Communication Page   Notification Time [id number]     pulm consult notified

## 2021-11-12 NOTE — PROGRESS NOTES
[facility]  Progress Note    Mekhi Romero Patient Status:  Inpatient    3/24/1926 MRN [id number]   [facility] 5NW-A Attending Estelle Mata MD   Hosp Day # 2 PCP Geno Prajapati MD     CC: Shortness of breath    [address] [place] [address] renderings are generated.  Dose reduction techniques were used.  Dose information is transmitted to the ACR ([employer] of   Radiology) NRDR ([address]) which includes the Dose Index Registry.       PATIENT STATED HISTORY:(As tr  Tortuous aorta.  Borderline pulmonary vascularity.  Perihilar interstitial opacities.  No focal consolidation.  Degenerative changes of   both shoulders.  Bilateral carotid artery calcifications.                        Impression   CONCLUSION:  Perihilar normal  5. Blood culture with no growth  6. Rapid Covid screening negative, Covid alinity PCR pending  7.  Patient recently had facial drainage which was cultured and Culture positive for E. coli sensitive to Augmentin and patient finished course of Augment

## 2021-11-12 NOTE — PHYSICAL THERAPY NOTE
PHYSICAL THERAPY TREATMENT NOTE - INPATIENT    Room Number: 838/256-A     Session: 1   Number of Visits to Meet Established Goals: 4    Presenting Problem: shortness of breath   Co-Morbidities : cancer, radiation, pacemaker, heart disease    Problem List Help from Another: Climbing 3-5 steps with a railing?: A Lot     AM-PAC Score:  Raw Score: 17   Approx Degree of Impairment: 50.57%   Standardized Score (AM-PAC Scale): 42.13   CMS Modifier (G-Code): CK    FUNCTIONAL ABILITY STATUS  Functional Mobility/G training;Transfer training;Balance training  Rehab Potential : Good  Frequency (Obs): 3-5x/week    CURRENT GOALS      Goal #1 Patient is able to demonstrate supine - sit EOB @ level: modified independent      Goal #2 Patient is able to demonstrate transfer

## 2021-11-12 NOTE — CM/SW NOTE
11/12/21 0900   CM/SW Referral Data   Referral Source Social Work (self-referral)   Reason for Referral Discharge planning   Informant Daughter   Patient Info   Patient's Current Mental Status at Time of Assessment Alert;Oriented   Patient's Home [employer]

## 2021-11-13 VITALS
WEIGHT: 142.5 LBS | BODY MASS INDEX: 24 KG/M2 | DIASTOLIC BLOOD PRESSURE: 66 MMHG | SYSTOLIC BLOOD PRESSURE: 140 MMHG | RESPIRATION RATE: 20 BRPM | TEMPERATURE: 98 F | OXYGEN SATURATION: 99 % | HEART RATE: 76 BPM

## 2021-11-13 PROCEDURE — 99239 HOSP IP/OBS DSCHRG MGMT >30: CPT | Performed by: HOSPITALIST

## 2021-11-13 RX ORDER — AZELASTINE 1 MG/ML
1 SPRAY, METERED NASAL 2 TIMES DAILY
Qty: 30 ML | Refills: 0 | Status: SHIPPED | OUTPATIENT
Start: 2021-11-13

## 2021-11-13 RX ORDER — FLUTICASONE PROPIONATE 50 MCG
1 SPRAY, SUSPENSION (ML) NASAL 2 TIMES DAILY
Qty: 16 G | Refills: 0 | Status: SHIPPED | OUTPATIENT
Start: 2021-11-13

## 2021-11-13 NOTE — CM/SW NOTE
Spoke w/daughter who stated she no longer wants the pt to go to [facility]. She stated the OT said home w/supervision and she is on FMLA from work. [facility] will not accept pt due to radiation. Sent [place] referrals in [facility].  Will follow up w/daughter in regards

## 2021-11-13 NOTE — OCCUPATIONAL THERAPY NOTE
OCCUPATIONAL THERAPY EVALUATION - INPATIENT     Room Number: 473/429-U  Evaluation Date: 11/13/2021  Type of Evaluation: Initial  Presenting Problem: acute on chronic CHF    Physician Order: IP Consult to Occupational Therapy  Reason for Therapy: ADL/IADL (retired)  Drives: No (not in 2 months)  Patient Regularly Uses: Glasses; Hearing aides     Prior Level of Function:   Lives alone in a ranch with basement. Has not driven in a couple of months. Daughter lives 5 minutes away and comes over daily .  Dtr Jane Alvarez Putting on and taking off regular upper body clothing?: A Little  -   Taking care of personal grooming such as brushing teeth?: None  -   Eating meals?: None    AM-PAC Score:  Score: 20  Approx Degree of Impairment: 38.32%  Standardized Score (AM-PAC Scale skilled inpatient OT to address the above deficits, maximizing patient’s ability to return safely to his prior level of function.   The AM-DURGA ' '6-Clicks' Inpatient Daily Activity Short Form was completed and  this patient  is demonstrating a  38% degree i idea  PPE worn by writer: droplet mask, gloves, goggles. Patient was unmasked.

## 2021-11-13 NOTE — PLAN OF CARE
Received patient alert and orientated, forgetful. Oxygen WNL on room air, . Vpaced on tele. Pt with no complaints of pain, medications given per MAR. Tolerating diet. Voiding. Up with assistance and walker.  Instructed to call for help, call light within goal  Description: INTERVENTIONS:  - Encourage pt to monitor pain and request assistance  - Assess pain using appropriate pain scale  - Administer analgesics based on type and severity of pain and evaluate response  - Implement non-pharmacological measures to assist at discharge as needed  - Consider post-discharge preferences of patient/family/discharge partner  - Complete POLST form as appropriate  - Assess patient's ability to be responsible for managing their own health  - Refer to Case Management Depart

## 2021-11-13 NOTE — PLAN OF CARE
NURSING DISCHARGE NOTE    Discharged Home via Wheelchair. Accompanied by Family member and Support staff  Belongings Taken by patient/family. Discharge instructions reviewed with pt and daughter. All questions answered. Aware of f/u appts.  All [facility] Evaluate effectiveness of antiarrhythmic and heart rate control medications as ordered  - Initiate emergency measures for life threatening arrhythmias  - Monitor electrolytes and administer replacement therapy as ordered  11/13/2021 1652 by Ace Sams limitations  - Instruct pt to call for assistance with activity based on assessment  - Modify environment to reduce risk of injury  - Provide assistive devices as appropriate  - Consider OT/PT consult to assist with strengthening/mobility  - Encourage toil

## 2021-11-13 NOTE — CDS QUERY
Potential for Impaired Nutritional Status  DOCUMENTATION CLARIFICATION FORM  Dear Doctor:  Clinical information documented by the Clinical Dietician suggests potential for impaired nutritional status.  For accurate ICD-10-CM code assignment to reflect sever

## 2021-11-13 NOTE — PLAN OF CARE
Assumed care of pt @ 5786. Pt is A/Ox 4. On RA, VSS, SR on tele. IV saline locked  Tolerating diet. PT/OT recommending-home with supervision, daughter to stay with patient  Pt denies pain  Up as tolerated  Intake/outputs WNL.     Plan dc home today patient's stated pain goal  Description: INTERVENTIONS:  - Encourage pt to monitor pain and request assistance  - Assess pain using appropriate pain scale  - Administer analgesics based on type and severity of pain and evaluate response  - Implement non-ph Arrange for interpreters to assist at discharge as needed  - Consider post-discharge preferences of patient/family/discharge partner  - Complete POLST form as appropriate  - Assess patient's ability to be responsible for managing their own health  - Refer

## 2021-11-13 NOTE — PLAN OF CARE
Assumed care of patient at [address] [facility]. Monitor on telemetry V paced, continuous pulse ox on room air. Denies any pain. Up with assist with walker. Fall precautions in place.  Will continue to monitor

## 2021-11-13 NOTE — PROGRESS NOTES
[facility] Pulmonary/Critical Care Progress Note     SUBJECTIVE/Interval history:  No acute event overnight, he feels 'fine' today, denies any concerns, no cough, phlegm, dyspnea. Nasal congestion improved.  Remains on RA 11/11/21  0635   RBC 3.86 3.71*   HGB 11.8* 11.4*   HCT 36.6* 34.9*   MCV 94.8 94.1   MCH 30.6 30.7   MCHC 32.2 32.7   RDW 16.1 16.0   NEPRELIM 7.48 6.17   WBC 9.1 7.7   .0 176.0     No results for input(s): BNP in the last 168 hours.   Recent Labs PM         • fluticasone propionate  1 spray Each Nare BID   • Azelastine HCl  1 spray Each Nare BID   • multivitamin with minerals  1 tablet Oral Daily   • furosemide  20 mg Oral Daily   • Heparin Sodium (Porcine)  5,000 Units Subcutaneous Q8H [facility]   • fin

## 2021-11-14 NOTE — DISCHARGE SUMMARY
[facility] HOSPITALIST  DISCHARGE SUMMARY     Abelino Falcon Patient Status:  Inpatient    3/24/1926 MRN [id number]   [facility] 5NW-A Attending No att. providers found   [facility] Day # 3 PCP Geno Ashraf MD     Date of Admission: · none    Consultants:  • Pulmonary  • cardiology    Discharge Medication List:     Discharge Medications      START taking these medications      Instructions Prescription details   Azelastine HCl 0.1 % Soln  Commonly known as: ASTELIN      1 spray by N each of these medications  · Azelastine HCl 0.1 % Soln  · fluticasone propionate 50 MCG/ACT Susp         [place]: [place]    Follow-up appointment:   Nancy Handley  [facility] [phone]    In 2 weeks  or Nurse practi Amina [id number]    Patient Instructions:         Location Instructions:     [address] [place] [address]               11/23/2021 10:00 AM  TREATMENT [2390] 15 min Radiation Oncology - Janelle Argueta TREATMENT [2390] 15 min Radiation Oncology - Amina [id number]    Patient Instructions:         Location Instructions:     [address]               12/8/2021 10:00 AM  TREATMENT [2390] 15 min Radiation O

## 2021-11-14 NOTE — HOME CARE LIAISON
Patient's daughter Dory's contacted via phone Jennifer Ingram choice is [address]. No list offered as patient discharged prior to Liason getting to see patient. Agency reserved in [address] and contact information placed on AVS.   Financial interest disclosu

## 2021-11-15 ENCOUNTER — TELEPHONE (OUTPATIENT)
Dept: FAMILY MEDICINE CLINIC | Facility: CLINIC | Age: 86
End: 2021-11-15

## 2021-11-18 ENCOUNTER — TELEPHONE (OUTPATIENT)
Dept: FAMILY MEDICINE CLINIC | Facility: CLINIC | Age: 86
End: 2021-11-18

## 2021-11-18 NOTE — TELEPHONE ENCOUNTER
Received fax from [facility] and [facility]    Certification period: 11/15/2021 - 01/13/2022    Dr. Eduardo Colin reviewed, signed, and faxed back to #[phone] on 11/17/2021    Send to scanning

## 2021-11-21 ENCOUNTER — APPOINTMENT (OUTPATIENT)
Dept: GENERAL RADIOLOGY | Facility: HOSPITAL | Age: 86
DRG: 314 | End: 2021-11-21
Attending: EMERGENCY MEDICINE
Payer: MEDICARE

## 2021-11-21 ENCOUNTER — APPOINTMENT (OUTPATIENT)
Dept: CT IMAGING | Facility: HOSPITAL | Age: 86
DRG: 314 | End: 2021-11-21
Attending: EMERGENCY MEDICINE
Payer: MEDICARE

## 2021-11-21 ENCOUNTER — HOSPITAL ENCOUNTER (INPATIENT)
Facility: HOSPITAL | Age: 86
LOS: 3 days | Discharge: HOME HEALTH | DRG: 314 | End: 2021-11-24
Attending: EMERGENCY MEDICINE | Admitting: INTERNAL MEDICINE
Payer: MEDICARE

## 2021-11-21 DIAGNOSIS — J96.91 RESPIRATORY FAILURE WITH HYPOXIA, UNSPECIFIED CHRONICITY (HCC): ICD-10-CM

## 2021-11-21 DIAGNOSIS — R77.8 ELEVATED TROPONIN: ICD-10-CM

## 2021-11-21 DIAGNOSIS — I50.9 ACUTE ON CHRONIC CONGESTIVE HEART FAILURE, UNSPECIFIED HEART FAILURE TYPE (HCC): ICD-10-CM

## 2021-11-21 DIAGNOSIS — R06.02 SOB (SHORTNESS OF BREATH): Primary | ICD-10-CM

## 2021-11-21 PROCEDURE — 71260 CT THORAX DX C+: CPT | Performed by: EMERGENCY MEDICINE

## 2021-11-21 PROCEDURE — 71045 X-RAY EXAM CHEST 1 VIEW: CPT | Performed by: EMERGENCY MEDICINE

## 2021-11-21 PROCEDURE — 99223 1ST HOSP IP/OBS HIGH 75: CPT | Performed by: HOSPITALIST

## 2021-11-21 RX ORDER — ACETAMINOPHEN 325 MG/1
650 TABLET ORAL EVERY 6 HOURS PRN
Status: DISCONTINUED | OUTPATIENT
Start: 2021-11-21 | End: 2021-11-24

## 2021-11-21 RX ORDER — ASPIRIN 81 MG/1
81 TABLET ORAL DAILY
Status: DISCONTINUED | OUTPATIENT
Start: 2021-11-21 | End: 2021-11-24

## 2021-11-21 RX ORDER — DOCUSATE SODIUM 100 MG/1
100 CAPSULE, LIQUID FILLED ORAL 2 TIMES DAILY
Status: DISCONTINUED | OUTPATIENT
Start: 2021-11-21 | End: 2021-11-24

## 2021-11-21 RX ORDER — SODIUM PHOSPHATE, DIBASIC AND SODIUM PHOSPHATE, MONOBASIC 7; 19 G/133ML; G/133ML
1 ENEMA RECTAL ONCE AS NEEDED
Status: DISCONTINUED | OUTPATIENT
Start: 2021-11-21 | End: 2021-11-24

## 2021-11-21 RX ORDER — FLUTICASONE PROPIONATE 50 MCG
1 SPRAY, SUSPENSION (ML) NASAL 2 TIMES DAILY
Status: DISCONTINUED | OUTPATIENT
Start: 2021-11-21 | End: 2021-11-24

## 2021-11-21 RX ORDER — BISACODYL 10 MG
10 SUPPOSITORY, RECTAL RECTAL
Status: DISCONTINUED | OUTPATIENT
Start: 2021-11-21 | End: 2021-11-24

## 2021-11-21 RX ORDER — ATORVASTATIN CALCIUM 40 MG/1
40 TABLET, FILM COATED ORAL NIGHTLY
Status: DISCONTINUED | OUTPATIENT
Start: 2021-11-21 | End: 2021-11-24

## 2021-11-21 RX ORDER — FUROSEMIDE 10 MG/ML
40 INJECTION INTRAMUSCULAR; INTRAVENOUS ONCE
Status: COMPLETED | OUTPATIENT
Start: 2021-11-21 | End: 2021-11-21

## 2021-11-21 RX ORDER — AZELASTINE 1 MG/ML
1 SPRAY, METERED NASAL 2 TIMES DAILY
Status: DISCONTINUED | OUTPATIENT
Start: 2021-11-21 | End: 2021-11-24

## 2021-11-21 RX ORDER — LISINOPRIL 10 MG/1
10 TABLET ORAL DAILY
Status: DISCONTINUED | OUTPATIENT
Start: 2021-11-21 | End: 2021-11-24

## 2021-11-21 RX ORDER — METOCLOPRAMIDE HYDROCHLORIDE 5 MG/ML
5 INJECTION INTRAMUSCULAR; INTRAVENOUS EVERY 8 HOURS PRN
Status: DISCONTINUED | OUTPATIENT
Start: 2021-11-21 | End: 2021-11-24

## 2021-11-21 RX ORDER — POLYETHYLENE GLYCOL 3350 17 G/17G
17 POWDER, FOR SOLUTION ORAL DAILY PRN
Status: DISCONTINUED | OUTPATIENT
Start: 2021-11-21 | End: 2021-11-24

## 2021-11-21 RX ORDER — FINASTERIDE 5 MG/1
5 TABLET, FILM COATED ORAL DAILY
Status: DISCONTINUED | OUTPATIENT
Start: 2021-11-21 | End: 2021-11-24

## 2021-11-21 RX ORDER — HEPARIN SODIUM 5000 [USP'U]/ML
5000 INJECTION, SOLUTION INTRAVENOUS; SUBCUTANEOUS EVERY 8 HOURS SCHEDULED
Status: DISCONTINUED | OUTPATIENT
Start: 2021-11-21 | End: 2021-11-24

## 2021-11-21 RX ORDER — ONDANSETRON 2 MG/ML
4 INJECTION INTRAMUSCULAR; INTRAVENOUS EVERY 6 HOURS PRN
Status: DISCONTINUED | OUTPATIENT
Start: 2021-11-21 | End: 2021-11-24

## 2021-11-21 RX ORDER — IPRATROPIUM BROMIDE AND ALBUTEROL SULFATE 2.5; .5 MG/3ML; MG/3ML
SOLUTION RESPIRATORY (INHALATION)
Status: DISPENSED
Start: 2021-11-21 | End: 2021-11-21

## 2021-11-21 NOTE — CONSULTS
[facility]  Cardiology Consultation    Swetha Uriostegui Patient Status:  Emergency    3/24/1926 MRN [id number]   Location [address] [facility] Attending Augie Diop MD   Hosp Day # 0 PCP Geno Martinez MD     Reason f constitutional distress. HEENT: Normocephalic, anicteric sclera, neck supple. Neck: No JVD   Cardiac: Regular rate and rhythm. S1, S2 normal.   Lungs: diminished breath sounds at bases  Abdomen: Soft, non-tender.     Extremities: Without edema  Neurologic

## 2021-11-21 NOTE — ED QUICK NOTES
Orders for admission, patient is aware of plan and ready to go upstairs. Any questions, please call ED LINDA Ayala  at extension [phone]. Vaccinated? Yes  Type of COVID test sent:  COVID Suspicion level: Low      Titratable drug(s) infusing:  Rate:    LOC

## 2021-11-21 NOTE — ED PROVIDER NOTES
Patient Seen in: [facility] Emergency Department      History   Patient presents with:  Difficulty Breathing    Stated Complaint: SOB    Subjective:   HPI    This is a 27-year-old man, history of hypertension, pulmonary hypertension, pulmonary fibro normal.  Normal breath sounds. No wheezing, rhonchi or rales.    Abdominal: Soft nontender nondistended, normal bowel sounds, no guarding no rebound tenderness  Skin: Warm and dry  Neurological: Awake alert, there is a pronounced right-sided facial droop, frequent PVCs, nonspecific changes no acute ischemic changes              CT SOFT TISSUE OF NECK(CONTRAST ONLY) (CPT=70491)    Result Date: 11/5/2021  DATE OF SERVICE: 11.05.2021 CT SOFT TISSUE OF NECK(CONTRAST ONLY) (CPT=70491) CLINICAL INDICATION:  Shaggy Mis ill-defined soft tissue thickening surrounding the distal CCA, carotid bifurcation, and proximal ICA and ECA, consistent with postradiation changes.  Again noted is marked predominant calcified atherosclerotic plaque at the carotid bifurcations bilaterally, interstitial opacities. Scattered bilateral peripheral ground-glass opacities. MEDIASTINUM:  No adenopathy or mass. MARCO:  No mass or adenopathy. CARDIAC:  Extensive coronary artery calcium. .  Left transvenous pacemaker in place.  PLEURA:  No mass or eff wall pacemaker is noted. Extensive calcified atherosclerosis is noted. Multilevel degenerative changes are seen within the spine. IMPRESSION: Limited, unenhanced study performed for radiation planning purposes only. See above for details.      CARD ECHO The cavity size was normal. Wall thickness was mildly     increased. Systolic function was normal. The estimated ejection fraction     was 55-65%. No diagnostic evidence for regional wall motion     abnormalities.  The study is not technically sufficient to are consistent with right ventricular pacing. Left atrium:  The left atrium was moderately dilated. The left atrial volume was moderately increased. Right ventricle:   The cavity size was at the upper limits of normal. Pacer wire noted in the right ventricl Value          Reference  LV ID, ED, PLAX                                 4.4   cm       4.2 - 5.9  LV ID, ES, PLAX                                 2.8   cm       ---------  LV fx shortening, PLAX                          37    %        25 - 43  LV Aortic valve area, VTI                          1.43  cm^2     ---------  Aortic valve area/bsa, VTI                      0.84  cm^2/m^2 ---------  Velocity ratio, peak, LVOT/AV                   0.36           ---------  Aortic valve area, peak velocity 76    mm Hg    ---------   Pulmonic valve                                  Value          Reference  Pulmonic regurg velocity, ED                    1.25  m/sec    ---------  Pulmonic regurg gradient, ED                    6     mm Hg (CPT=71260)    Result Date: 11/21/2021  PROCEDURE:  CT CHEST PE AORTA (IV ONLY) (CPT=71260)  COMPARISON:  [facility] , CT, CT ANGIOGRAPHY, CHEST (CPT=71275), 11/10/2021, 5:01 PM.  [facility] , XR, XR CHEST AP PORTABLE  (CPT=71045), 11/21/2021, 11:14 AM.  Elena Temple right parotid gland on current x-ray therapy, pulmonary fibrosis pulmonary hypertension here for evaluation of hypoxemia. CTA of the chest shows no acute pulmonary embolism, patient does report some symptoms of orthopnea.   Will give 1 dose of IV Lasix, ad

## 2021-11-21 NOTE — H&P
[facility] HOSPITALIST  History and Physical     Maddie Henderson Patient Status:  Emergency    3/24/1926 MRN [id number]   Location [address] [facility] Attending Logan Olsen MD   Hosp Day # 0 PCP Geno Unger MD     Chief Rfl:   lisinopril 10 MG Oral Tab, Take 1 tablet (10 mg total) by mouth daily. , Disp: 90 tablet, Rfl: 1  atorvastatin 40 MG Oral Tab, Take 1 tablet (40 mg total) by mouth nightly., Disp: 90 tablet, Rfl: 1  finasteride 5 MG Oral Tab, Take 1 tablet (5 mg tota 11/21/2021    COVID19 Not Detected 11/10/2021    COVID19 Not Detected 11/10/2021       Pro-Calcitonin  No results for input(s): PCT in the last 168 hours.     Cardiac  Recent Labs   Lab 11/21/21  1022   TROP 0.073*   PBNP 8,734*       Creatinine Kinase  No

## 2021-11-21 NOTE — ED QUICK NOTES
Call to charge nurse on floor, as no nurse has signed up for patient.   States Elisabeth RN, is the receiving nurse at [phone]

## 2021-11-22 ENCOUNTER — TELEPHONE (OUTPATIENT)
Dept: FAMILY MEDICINE CLINIC | Facility: CLINIC | Age: 86
End: 2021-11-22

## 2021-11-22 PROCEDURE — 90792 PSYCH DIAG EVAL W/MED SRVCS: CPT | Performed by: OTHER

## 2021-11-22 PROCEDURE — 99232 SBSQ HOSP IP/OBS MODERATE 35: CPT | Performed by: HOSPITALIST

## 2021-11-22 RX ORDER — FUROSEMIDE 10 MG/ML
40 INJECTION INTRAMUSCULAR; INTRAVENOUS ONCE
Status: COMPLETED | OUTPATIENT
Start: 2021-11-22 | End: 2021-11-22

## 2021-11-22 RX ORDER — ESCITALOPRAM OXALATE 5 MG/1
5 TABLET ORAL DAILY
Status: DISCONTINUED | OUTPATIENT
Start: 2021-11-23 | End: 2021-11-24

## 2021-11-22 NOTE — CONSULTS
[facility]  Report of Psychiatric Consultation    Taran Lisa Patient Status:  Inpatient    3/24/1926 MRN [id number]   [facility] 8NE-A Attending Jt Chacon [address] Day # 1 PCP Geno Durbin MD     Date wanted to die and had a plan of poisoning himself with CO by turning the car on in the garage. He was placed on suicide precautions. Currently, he feels depressed and hopeless and has suicidal ideation. He doesn't care if he .  When I ask if he has (Porcine) 5000 UNIT/ML injection 5,000 Units, 5,000 Units, Subcutaneous, Q8H [address]  •  acetaminophen (TYLENOL) tab 650 mg, 650 mg, Oral, Q6H PRN  •  ondansetron (ZOFRAN) injection 4 mg, 4 mg, Intravenous, Q6H PRN  •  metoclopramide (REGLAN) injection 5 mg, 5

## 2021-11-22 NOTE — TELEPHONE ENCOUNTER
Kailey Francis from home health called she wanted in inform doctor she got a call from Pt's daughter he was admitted to the hospital

## 2021-11-22 NOTE — PLAN OF CARE
Patient Aox4. Sitter present d/t suicide precaution. Pt denies any pain. Shortness of breath with exertion. Pt on 1L O2 NC, will wean as able. Vss. Vpaced on tele. Pt has R eye drooping at baseline. Pt is incontinent of B&B, wears diaper.  Pt has rash on bi baseline  Description: INTERVENTIONS:  - Continuous cardiac monitoring, monitor vital signs, obtain 12 lead EKG if indicated  - Evaluate effectiveness of antiarrhythmic and heart rate control medications as ordered  - Initiate emergency measures for life t Bridgett Del Rosario RN  Outcome: Progressing  11/21/2021 2340 by Nas Onofre RN  Outcome: Progressing

## 2021-11-22 NOTE — DIETARY MALNUTRITION NOTE
[facility]    NUTRITION ASSESSMENT    Pt meets moderate malnutrition criteria.     CRITERIA FOR MALNUTRITION DIAGNOSIS:  Criteria for non-severe malnutrition diagnosis: acute illness/injury related to energy intake less than 75% for greater than 7 days days)     Date/Time Percent Meals Eaten (%)    11/22/21 1011 40 %        FOOD/NUTRITION RELATED HISTORY:   Appetite: Poor  Intake: 25%  Intake Meeting Needs: No, but supplements to maximize  Food Allergies: No  Cultural/Ethnic/Holiness Preferences Address

## 2021-11-22 NOTE — PROGRESS NOTES
Progress Note  Glenis Ferrer Patient Status:  Inpatient    3/24/1926 MRN [id number]   [facility] 8NE-A Attending Merline Hartshorn [address] Day # 1 PCP Geno Carrillo MD     Subjective:  No overnight events  Awake, a changes are       consistent with right ventricular pacing. 3.  Aortic valve: Probably trileaflet; mildly thickened, mildly calcified       leaflets. Transvalvular velocity was increased. There was mild stenosis.     Trivial regurgitation.  Peak velocit diuresis.   Renal fx and electrolytes stable  • Chronically elevated troponin  • HTN - currently controlled, lisinopril  • Atrial fibrillation w/PPM  • Aortic stenosis  • Dyslipidemia - atorvastatin    Plan:    • Continue IV lasix, will reassess daily  • St

## 2021-11-22 NOTE — TELEPHONE ENCOUNTER
11/20/2021    Received fax from [facility] PT regarding pt cancelled appt to move to week of 12/21/2021  Lalita Bermeo notified of note above.

## 2021-11-22 NOTE — PLAN OF CARE
Rec'd from ED aox4. Denies pain. Breathing even and unlabored. Lasix given in ED. On 3L of o2 via NC and o2 sat 100%, will wean down O2 as tolerated. Lungs clear but diminished. Oriented to room. Continue to monitor closley.  vss vpaced on tele

## 2021-11-22 NOTE — SLP NOTE
ADULT SWALLOWING EVALUATION    ASSESSMENT    ASSESSMENT/OVERALL IMPRESSION:  Patient is a 79 y/o male admitted with SOB and PMHx significant for head and neck cancer for which he is currently receiving radiation therapy.  Patient known to this service for p time  Treatment Plan/Recommendations: No further inpatient SLP service warranted       HISTORY   MEDICAL HISTORY  Reason for Referral: Altered diet consistency    Problem List  Principal Problem:    SOB (shortness of breath)  Active Problems:    Essential Bilabial Seal: Impaired        Mastication: Impaired       Pharyngeal Phase of Swallow: Within Functional Limits           (Please note: Silent aspiration cannot be evaluated clinically.  Videofluoroscopic Swallow Study is required to rule-out silent aspi

## 2021-11-22 NOTE — PROGRESS NOTES
[facility]     Hospitalist Progress Note     [facility] Patient Status:  Inpatient    3/24/1926 MRN [id number]   [facility] 8NE-A Attending Abebe Chavez [address] Day # 1 PCP Geno Diana MD     Chief Com in the last 168 hours. Inflammatory Markers  Recent Labs   Lab 11/21/21  1022   DDIMER 2.34*       Imaging: Imaging data reviewed in Epic.     Medications:   • aspirin  81 mg Oral Daily   • atorvastatin  40 mg Oral Nightly   • Azelastine HCl  1 spray Steven

## 2021-11-22 NOTE — PLAN OF CARE
Patient alert and oriented x 4. He is Paced on tele. Denies chest pain and shortness of breath. No signs of distress noted. Follow up labs ordered. Psych consult ordered. Sitter maintained. Falls, aspiration, suicide and safety precautions maintained. appropriate  - Consider OT/PT consult to assist with strengthening/mobility  - Encourage toileting schedule  Outcome: Progressing     Problem: SELF HARM  Goal: Patient will be protected from self-harm  Description: INTERVENTIONS:  - Initiate Suicide Precau

## 2021-11-22 NOTE — PLAN OF CARE
Received pt at 0730. A&o x4. Sitter at bedside, suicide precautions in place. Baseline R sided facial droop. Dentures left at home. V paced on tele. VSS. Denies chest pain and SOB. WNL on room air. Non-productive cough. Up SBA with walker.  Last BM 11/20 wi cognitive and physical deficits and behaviors that affect risk of falls.   - Victor fall precautions as indicated by assessment.  - Educate pt/family on patient safety including physical limitations  - Instruct pt to call for assistance with activity bas

## 2021-11-23 PROCEDURE — 99232 SBSQ HOSP IP/OBS MODERATE 35: CPT | Performed by: HOSPITALIST

## 2021-11-23 PROCEDURE — 99223 1ST HOSP IP/OBS HIGH 75: CPT | Performed by: INTERNAL MEDICINE

## 2021-11-23 PROCEDURE — 99232 SBSQ HOSP IP/OBS MODERATE 35: CPT | Performed by: OTHER

## 2021-11-23 RX ORDER — FUROSEMIDE 40 MG/1
40 TABLET ORAL DAILY
Status: DISCONTINUED | OUTPATIENT
Start: 2021-11-23 | End: 2021-11-24

## 2021-11-23 NOTE — CONSULTS
[address] Patient Status:  Inpatient    3/24/1926 MRN [id number]   [facility] 8NE-A Attending Samantha Rivera [address] Day # 2 PCP Geno Wilder MD     Date of both his daughters to make decisions for him when he is not able to make decision for self. --talked about code status and explained about delineations. Pt mentioned that he does not know what to decide.    -- I asked if he is worried about anything he me does have a living will. Will request daughter to bring it to hospital.     Pt has 2 daughters. Sofia Alvarez and Millicent Damon. Sofia Alvarez lives in [place]. He did not want to do [facility] paperwork today.      We discussed the risks vs benefits of life sustaining treatments in the Significant Disease    50   Mainly sit/lie      Can't do any work      Partial Assist   Normal or reduced      Full or confused                                   Extensive Disease    40   Mainly in bed    Can't do any work      Mainly Assist    Normal suppository 10 mg, 10 mg, Rectal, Daily PRN  •  Fleet Enema (FLEET) 7-19 GM/118ML enema 133 mL, 1 enema, Rectal, Once PRN  No current outpatient medications on file.       Hematology:  Lab Results   Component Value Date    WBC 6.8 11/22/2021    HGB 10.4 (L) time  Psychiatric: irritable. Skin: Warm and dry.             Healthcare Agent Appointed: Yes  Healthcare Agent's Name: [address] [place], [place] [address] Road Agent's Phone Number: [phone]                    Assessment/Recommendations:    Encounter for Palliative

## 2021-11-23 NOTE — PLAN OF CARE
Received patient at 730. Alert and oriented x 4 tele rhythm V-paced. 02 saturation 100%  on 1L, breath sounds diminished. Bed is locked and in low position. Sitter at bedside for suicidal ideations   Call light and personal item within reach.   No compl therapy as ordered  Outcome: Progressing     Problem: SAFETY ADULT - FALL  Goal: Free from fall injury  Description: INTERVENTIONS:  - Assess pt frequently for physical needs  - Identify cognitive and physical deficits and behaviors that affect risk of fal

## 2021-11-23 NOTE — PROGRESS NOTES
[facility]     Hospitalist Progress Note     Kaila Juarez Patient Status:  Inpatient    3/24/1926 MRN [id number]   [facility] 8NE-A Attending Makayla Wise [address] Day # 2 PCP Geno Cintron MD     Chief Com 11/21/21  1022 11/22/21  1053   TROP 0.073* 0.056*   PBNP 8,734*  --        Creatinine Kinase  No results for input(s): CK in the last 168 hours.     Inflammatory Markers  Recent Labs   Lab 11/21/21  1022   DDIMER 2.34*       Imaging: Imaging data reviewed midnight's based on the clinical documentation in H+P. Based on patients current state of illness, I anticipate that, after discharge, patient will require TBD.

## 2021-11-23 NOTE — CONSULTS
[facility]  Report of Neuropsycholgy Consultation    Maddie Henderson Patient Status:  Inpatient    3/24/1926 MRN [id number]   [facility] 8NE-A [facility] Day # 2 PCP Geno Combs Heparin Sodium (Porcine) 5000 UNIT/ML injection 5,000 Units, 5,000 Units, Subcutaneous, Q8H [address]  •  acetaminophen (TYLENOL) tab 650 mg, 650 mg, Oral, Q6H PRN  •  ondansetron (ZOFRAN) injection 4 mg, 4 mg, Intravenous, Q6H PRN  •  metoclopramide (REGLAN) in was cooperative but when his cognitive limits were reached there was some mild agitation this was easily redirected.   His mood is depressed and he is able to state that \"in the past I was satisfied but it is no longer good\"  Test Results    The patient's thinking and was frustrated when he was unable to have more of a complex answer. The patient does endorse depression.   He is able to reflect on recent times that were positive but he is feels somewhat helpless at this time given his medical condition an

## 2021-11-23 NOTE — PLAN OF CARE
Patient A&Ox4. Vpaced on tele. Pt weaned from 1L NC to room air, sats >95%. Non-productive cough. Patient denies any pain or shortness of breath. Sitter at bedside for SI. Call light within reach.       Problem: Patient/Family Goals  Goal: Patient/Family Lo and administer replacement therapy as ordered  11/22/2021 2258 by Beatrice Mishra, RN  Outcome: Progressing  11/22/2021 2258 by Beatrice Mishra, RN  Outcome: Progressing     Problem: SAFETY ADULT - FALL  Goal: Free from fall injury  Description: INTERVENTIONS: Assess and instruct to report SOB or any respiratory difficulty  - Respiratory Therapy support as indicated  - Manage/alleviate anxiety  - Monitor for signs/symptoms of CO2 retention  11/22/2021 2258 by Kenyetta Tony RN  Outcome: Progressing  11/22/2021 2

## 2021-11-23 NOTE — PROGRESS NOTES
Progress Note  Marlise Cough Patient Status:  Inpatient    3/24/1926 MRN [id number]   [facility] 8NE-A Attending Yuni Sampson [address] Day # 2 PCP Geno uGzman MD     Subjective:  No overnight events  Awake, a motion       abnormalities. The study is not technically sufficient to allow       evaluation of LV diastolic function. 2.  Ventricular septum: Septal motion showed dyssynergy. These changes are       consistent with right ventricular pacing.    3.  Aorti 10 mg Oral Daily   • Heparin Sodium (Porcine)  5,000 Units Subcutaneous Q8H [address]   • docusate sodium  100 mg Oral BID         Assessment:    • SOB - multifactoral: diastolic dysfunction, aortic stenosis. Gentle diuresis.  Cr trending up, electrolytes stable

## 2021-11-23 NOTE — PROGRESS NOTES
[facility]  Report of Psychiatric Progress Note    Rachael Glez Patient Status:  Inpatient    3/24/1926 MRN [id number]   [facility] 8NE-A Attending Rahat Salcedo [address] Day # 2 PCP Geno Matos MD     Gustavo to die and had a plan of poisoning himself with CO by turning the car on in the garage. He was placed on suicide precautions. Currently, he feels depressed and hopeless and has suicidal ideation. He doesn't care if he .  When I ask if he has any int Known Allergies    Medications:    Current Facility-Administered Medications:   •  furosemide (LASIX) tab 40 mg, 40 mg, Oral, Daily  •  escitalopram (LEXAPRO) tablet 5 mg, 5 mg, Oral, Daily  •  aspirin EC tab 81 mg, 81 mg, Oral, Daily  •  atorvastatin (LIP intact remote  Language: Intact naming and repetition    Insight: limited   Judgment: limited    Laboratory Data:  Lab Results   Component Value Date    CREATSERUM 1.31 11/23/2021    BUN 60 11/23/2021     11/23/2021    K 4.4 11/23/2021     11/2

## 2021-11-24 ENCOUNTER — TELEPHONE (OUTPATIENT)
Dept: FAMILY MEDICINE CLINIC | Facility: CLINIC | Age: 86
End: 2021-11-24

## 2021-11-24 VITALS
RESPIRATION RATE: 17 BRPM | DIASTOLIC BLOOD PRESSURE: 52 MMHG | SYSTOLIC BLOOD PRESSURE: 108 MMHG | HEART RATE: 60 BPM | TEMPERATURE: 98 F | WEIGHT: 138.81 LBS | BODY MASS INDEX: 23 KG/M2 | OXYGEN SATURATION: 99 %

## 2021-11-24 PROCEDURE — 99233 SBSQ HOSP IP/OBS HIGH 50: CPT | Performed by: INTERNAL MEDICINE

## 2021-11-24 PROCEDURE — 99239 HOSP IP/OBS DSCHRG MGMT >30: CPT | Performed by: HOSPITALIST

## 2021-11-24 PROCEDURE — 99232 SBSQ HOSP IP/OBS MODERATE 35: CPT | Performed by: OTHER

## 2021-11-24 RX ORDER — ESCITALOPRAM OXALATE 5 MG/1
5 TABLET ORAL DAILY
Qty: 30 TABLET | Refills: 0 | Status: SHIPPED | OUTPATIENT
Start: 2021-11-25 | End: 2021-12-20

## 2021-11-24 RX ORDER — SODIUM CHLORIDE 9 MG/ML
INJECTION, SOLUTION INTRAVENOUS ONCE
Status: COMPLETED | OUTPATIENT
Start: 2021-11-24 | End: 2021-11-24

## 2021-11-24 RX ORDER — FUROSEMIDE 20 MG/1
20 TABLET ORAL DAILY
Status: DISCONTINUED | OUTPATIENT
Start: 2021-11-25 | End: 2021-11-24

## 2021-11-24 NOTE — PROGRESS NOTES
[facility]     Hospitalist Progress Note     Chicho Amabile Patient Status:  Inpatient    3/24/1926 MRN [id number]   [facility] 8NE-A Attending Ervin Gomes [address] Day # 3 PCP Geno Connolly MD     Chief Com PBNP 8,325*  --        Creatinine Kinase  No results for input(s): CK in the last 168 hours. Inflammatory Markers  Recent Labs   Lab 11/21/21  1022   DDIMER 2.34*       Imaging: Imaging data reviewed in Epic.     Medications:   • [START ON 11/25/2021] two midnight's based on the clinical documentation in H+P. Based on patients current state of illness, I anticipate that, after discharge, patient will require TBD.

## 2021-11-24 NOTE — PLAN OF CARE
Received patient at 730. Alert and oriented x 4 V-paced on tele. 02 saturation 97% on room air at rest, breath sounds diminished. Bed is locked and in low position. Call light and personal item within reach.   No complaint of chest pain or shortness of arrhythmias  - Monitor electrolytes and administer replacement therapy as ordered  Outcome: Progressing     Problem: SAFETY ADULT - FALL  Goal: Free from fall injury  Description: INTERVENTIONS:  - Assess pt frequently for physical needs  - Identify cognit

## 2021-11-24 NOTE — TELEPHONE ENCOUNTER
Received fax from [facility] regarding PT eval and treat to be moved week of 11/21 per pt request    Dr. Tanja Bermeo reviewed and signed.     Faxed back to #[phone]    Send to scanning

## 2021-11-24 NOTE — PROGRESS NOTES
Progress Note  Chicho Amabile Patient Status:  Inpatient    3/24/1926 MRN [id number]   [facility] 8NE-A Attending Ervin Gomes [address] Day # 3 PCP Geno Connolly MD     Subjective:  No overnight events  Awake, a  evaluation of LV diastolic function. 2.  Ventricular septum: Septal motion showed dyssynergy. These changes are       consistent with right ventricular pacing. 3.  Aortic valve: Probably trileaflet; mildly thickened, mildly calcified       leaflets.  Zee Elliott 5,000 Units Subcutaneous [facility]   • docusate sodium  100 mg Oral BID         Assessment:    • SOB - multifactoral: diastolic dysfunction, aortic stenosis. Gentle diuresis. Cr trending up, electrolytes stable.   Cr trending up - may be mildly volume contra

## 2021-11-24 NOTE — PLAN OF CARE
Assumed care of patient at 1. Patient is alert and oriented x4. On room air, lung sounds diminished bilaterally. Vpaced on tele. Incontinent of B&B, mepilex placed on sacrum for redness. Had BM today. Denies pain.  Bed in lowest position, bed alarm on, c ordered  Outcome: Progressing     Problem: SAFETY ADULT - FALL  Goal: Free from fall injury  Description: INTERVENTIONS:  - Assess pt frequently for physical needs  - Identify cognitive and physical deficits and behaviors that affect risk of falls.   - Inst

## 2021-11-24 NOTE — PROGRESS NOTES
[facility]idential Pall[facility] Liaison received palliative referral for community PC services. Waiting to obtain insurance (verification/authorization) prior to pursuing.          Jason Funes  Residential Palliative Liaison   [phone]

## 2021-11-24 NOTE — PROGRESS NOTES
[facility]  Report of Psychiatric Progress Note    Gildardo Barragan Patient Status:  Inpatient    3/24/1926 MRN [id number]   [facility] 8NE-A Attending Lydia Fisher [address] Day # 3 PCP Geno Evans MD     Gustavo doesn't care if he . When I ask if he has any intention of hurting himself, he says, \"I don't know. \" When I ask if he has any specific plan to hurt himself, he says, \"I don't know\". He admits to feeling helpless because of his medical issues.  He ha used smokeless tobacco. He reports current alcohol use of about 1.0 standard drink of alcohol per week. He reports that he does not use drugs.     Allergies:  No Known Allergies    Medications:    Current Facility-Administered Medications:   •  [START ON 11 restricted    Thought process: logical  Thought content: no hallucinations  Associations: Intact    Orientation: oriented person, place and oriented situation  Attention and Concentration: fair  Memory:  intact remote  Language: Intact naming and repetitio severely impaired. On the neurobehavioral cognitive status examination for unrelated words are rehearsed 10 times.   After only a 5-minute delay he did recall 1 word with a categorical cue which is likely why there is intermittent functional memory and int process information and understand past information to make informed complex decisions for the future. Given this measurable impairment I would recommend deferring complex decisions to an established power of , surrogate or guardian.   When agitati

## 2021-11-24 NOTE — CM/SW NOTE
11/24/21 1400   CM/SW Referral Data   Referral Source Physician   Reason for Referral Discharge planning   Informant Patient;Daughter;EMR     Sw met with pt and later called daughter Paxton Urias to discuss dc plans.   Pt is 79 yo male, admitted due to  Dyspnea

## 2021-11-24 NOTE — PROGRESS NOTES
[id number] [address] [facility]:  [facility]    3/24/1926 MRN [id number]   [facility] 8NE-A Attending Codie Encarnacion MD   Hosp Day # 3 PCP Geno Belcher MD     [facility] are noted in subjective.     Allergies:  No Known Allergies    Medications:     Current Facility-Administered Medications:   •  [START ON 11/25/2021] furosemide (LASIX) tab 20 mg, 20 mg, Oral, Daily  •  escitalopram (LEXAPRO) tablet 5 mg, 5 mg, Oral, Daily [address] 94 11/23/2021    BILT 1.0 11/23/2021    TP 6.3 (L) 11/23/2021    AST 40 (H) 11/23/2021    ALT 59 11/23/2021    DDIMER 2.34 (H) 11/21/2021    TROP 0.056 (HH) 11/22/2021       Imaging:  [unfilled]    Objective:  Vital Signs:  Blood pressure 11 face contact, history taking, physical examination, and >50% was spent counseling and coordinating care.   After review of medical chart,medical history, physical, diagnosis and treatment plans, pt is palliative care appropriate    Viet Chilel MD  11/24/

## 2021-11-25 NOTE — DISCHARGE SUMMARY
[facility] HOSPITALIST  DISCHARGE SUMMARY     Hardeep Damon Patient Status:  Inpatient    3/24/1926 MRN [id number]   [facility] 8NE-A Attending No att. providers found   [facility] Day # 3 PCP Geno Mao MD     Date of Admission: START taking these medications      Instructions Prescription details   escitalopram 5 MG Tabs  Commonly known as: China Lewis  Start taking on: November 25, 2021      Take 1 tablet (5 mg total) by mouth daily.    Quantity: 30 tablet  Refills: 0        CON Time Visit Type Length Department Provider     11/29/2021 10:00 AM  TREATMENT [2390] 15 min Radiation Oncology - Amina ContrerasN2266    Patient Instructions:         Location Instructions:     [address] [place] [address] Location Instructions:     [address]               12/10/2021 10:00 AM  TREATMENT [2390] 15 min Radiation Oncology - Amina BrightN2266    Patient Instructions:         Location Instructions:     [address] [id number]    Patient Instructions:         Location Instructions:     [address] [place] [address]               12/23/2021 10:15 AM  TREATMENT [2390] 15 min Radiation Oncology - Amina Nunez    Patient In

## 2021-11-25 NOTE — PROGRESS NOTES
DISCHARGE NOTE      Pt. Discharged home with daughter and son-in-law. IV removed tele dc'd and returned to monitor tech. F/U instructions provided and discussed. Pt.  And family verbali

## 2021-11-29 ENCOUNTER — TELEMEDICINE (OUTPATIENT)
Dept: FAMILY MEDICINE CLINIC | Facility: CLINIC | Age: 86
End: 2021-11-29

## 2021-11-29 ENCOUNTER — TELEPHONE (OUTPATIENT)
Dept: FAMILY MEDICINE CLINIC | Facility: CLINIC | Age: 86
End: 2021-11-29

## 2021-11-29 DIAGNOSIS — I50.9 ACUTE ON CHRONIC CONGESTIVE HEART FAILURE, UNSPECIFIED HEART FAILURE TYPE (HCC): ICD-10-CM

## 2021-11-29 DIAGNOSIS — H02.132 SENILE ECTROPION OF RIGHT LOWER EYELID: ICD-10-CM

## 2021-11-29 DIAGNOSIS — G51.0 RIGHT-SIDED BELL'S PALSY: Primary | ICD-10-CM

## 2021-11-29 DIAGNOSIS — S01.80XA OPEN WOUND OF OTHER PART OF HEAD, UNSPECIFIED OPEN WOUND TYPE, INITIAL ENCOUNTER: ICD-10-CM

## 2021-11-29 DIAGNOSIS — T66.XXXA ADVERSE EFFECT OF RADIATION THERAPY, INITIAL ENCOUNTER: ICD-10-CM

## 2021-11-29 DIAGNOSIS — H10.501 BLEPHAROCONJUNCTIVITIS OF RIGHT EYE, UNSPECIFIED BLEPHAROCONJUNCTIVITIS TYPE: ICD-10-CM

## 2021-11-29 DIAGNOSIS — J84.10 PULMONARY FIBROSIS (HCC): ICD-10-CM

## 2021-11-29 PROCEDURE — 99496 TRANSJ CARE MGMT HIGH F2F 7D: CPT | Performed by: FAMILY MEDICINE

## 2021-11-29 RX ORDER — CIPROFLOXACIN HYDROCHLORIDE 3.5 MG/ML
SOLUTION/ DROPS TOPICAL
Qty: 10 ML | Refills: 0 | Status: SHIPPED | OUTPATIENT
Start: 2021-11-29 | End: 2021-12-03

## 2021-11-29 NOTE — TELEPHONE ENCOUNTER
Pt's daughter called he states that was in [address] from 11/21-11/24. He has infection behind his ear and eye, she thinks that he might also have a sinus infection. She would like to have him come in to be seen asap. Where can we fit pt in?     Vinnie

## 2021-11-29 NOTE — PROGRESS NOTES
This is a telemedicine visit with live, interactive video and audio. Patient understands and accepts financial responsibility for any deductible, co-insurance and/or co-pays associated with this service.     Patient understands and accepts financial res orders Assisted in scheduling required follow-up with community providers and services. Medication Reconciliation:  I am aware of an inpatient discharge within the last 30 days.   The discharge medication list has been reconciled with the patient's curre eye.  Feels uncomfortable. Has been using artificial tears. However, has not been taping his eye. Course of antibiotics-amoxicillin completed on November 16  Recently saw Dr. Dilip Singh and was started on Bactrim today. Reviewed this in his medications. rhinitis, Cancer ([facility] [address] Essential hypertension, Glaucoma, Heart disease, High blood pressure, Osteoarthritis, Pacemaker, and Pulmonary fibrosis ([facility] [address] He  has a past surgical history that includes Cardiac pacemaker placement.     He family history includ Not in any discomfort of pain at this time.        ASSESSMENT/ PLAN:   Diagnoses and all orders for this visit:    Right-sided Bell's palsy    Adverse effect of radiation therapy, initial encounter    Open wound of other part of head, unspecified open w Refills for this Visit:  Requested Prescriptions     Signed Prescriptions Disp Refills   • ciprofloxacin 0.3 % Ophthalmic Solution 10 mL 0     Sig: Use 2-3 drops every 2-3 hours while awake for the first 2 days and then space out to every 12 hours to compl

## 2021-11-29 NOTE — TELEPHONE ENCOUNTER
Dr. Allen Andrews spoke with pt's dtr, Carlos, advised to schedule video visit  Dtr agreeable, Future appt made  Future Appointments   Date Time Provider [address]   11/29/2021  4:00 PM Geno Gould MD [facility] SANTANA Nash

## 2021-11-29 NOTE — TELEPHONE ENCOUNTER
Israle Lino from residential home called he has possible infection in lymph nodes  from radiation with pus like drainage. He was on Amoxicillin liquid before and the family is requesting that he goes back on that. He has also been extra fatigued.  Israel Lino states that

## 2021-12-01 ENCOUNTER — TELEPHONE (OUTPATIENT)
Dept: FAMILY MEDICINE CLINIC | Facility: CLINIC | Age: 86
End: 2021-12-01

## 2021-12-01 NOTE — TELEPHONE ENCOUNTER
Received fax from [facility] regarding pt's cert 14/50/2438 to [id number]/43/[id number]    Dr. Dennis Walker reviewed and signed    Faxed back to #[phone]    Send to scanning

## 2021-12-02 ENCOUNTER — MED REC SCAN ONLY (OUTPATIENT)
Dept: FAMILY MEDICINE CLINIC | Facility: CLINIC | Age: 86
End: 2021-12-02

## 2021-12-09 ENCOUNTER — TELEPHONE (OUTPATIENT)
Dept: FAMILY MEDICINE CLINIC | Facility: CLINIC | Age: 86
End: 2021-12-09

## 2021-12-09 NOTE — TELEPHONE ENCOUNTER
NURSE STATED THAT DAUGHTER CALLED THAT PT IS WHEEZING, NO TEMP. TOOK FALL ON 12/6/21 HIT HIS HEAD. NOT ON BLOOD THINNERS ONLY BABY ASPRIN.     PLEASE ADVISE-THANK YOU

## 2021-12-10 NOTE — TELEPHONE ENCOUNTER
If has been close to 4 days after the fall.  Head injury (on aspirin), if he is ok with no mental status changes and at baseline at this time there is no concern, however if there is slight change in behavior (such as excessive sleeping or headaches or naus

## 2021-12-10 NOTE — TELEPHONE ENCOUNTER
Spoke to pts daughter, ok per verbal release form that pt had radiation today and is feeling a bit tired and seems ok other than that he is gaining weight and eating some. Every now and then issues breathing.  Daughter stated will watch him over the weekend

## 2021-12-14 ENCOUNTER — TELEMEDICINE (OUTPATIENT)
Dept: FAMILY MEDICINE CLINIC | Facility: CLINIC | Age: 86
End: 2021-12-14
Payer: MEDICARE

## 2021-12-14 DIAGNOSIS — Z02.9 ADMINISTRATIVE ENCOUNTER: Primary | ICD-10-CM

## 2021-12-20 RX ORDER — ESCITALOPRAM OXALATE 5 MG/1
5 TABLET ORAL DAILY
Qty: 90 TABLET | Refills: 0 | Status: SHIPPED | OUTPATIENT
Start: 2021-12-20

## 2021-12-20 NOTE — TELEPHONE ENCOUNTER
The patients daughter, Mando Fritz called and needs to discuss is glucose readings. These are the highest readings for each day. ..  12/14- 221  12/15- 216  12/16- 182  12/17- 118  12/18- 164  12/19- 136    When patient as in the hospital, he was pres

## 2021-12-20 NOTE — TELEPHONE ENCOUNTER
Spoke with the daughter and she wanted to report the blood sugars-    Also pt has been on escitaloprm 5mg- needs refill please   This was started while in the hospital

## 2021-12-21 ENCOUNTER — TELEPHONE (OUTPATIENT)
Dept: FAMILY MEDICINE CLINIC | Facility: CLINIC | Age: 86
End: 2021-12-21

## 2021-12-21 NOTE — TELEPHONE ENCOUNTER
Pamela He from residential home [facility] called they are going to be discharging pt as he is going to [place]. She is needing a verbal okay.  She can be reached at [phone]

## 2021-12-27 ENCOUNTER — TELEPHONE (OUTPATIENT)
Dept: FAMILY MEDICINE CLINIC | Facility: CLINIC | Age: 86
End: 2021-12-27

## 2021-12-27 NOTE — TELEPHONE ENCOUNTER
RECEIVED MEDICAL RECORD REQUEST FROM [facility] REQUESTING ANY/ALL RECORDS.     SEND TO SCAN STAT

## 2022-01-03 ENCOUNTER — TELEPHONE (OUTPATIENT)
Dept: FAMILY MEDICINE CLINIC | Facility: CLINIC | Age: 87
End: 2022-01-03

## 2022-01-03 NOTE — TELEPHONE ENCOUNTER
Spot in back of his neck that got infected- would like antibiotic if possible-    Please advise-thank you

## 2022-01-03 NOTE — TELEPHONE ENCOUNTER
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

## 2022-01-04 NOTE — TELEPHONE ENCOUNTER
Patient daughter notified and verbalized understanding. States they do have an onstaff provider but they had not seen patient. Anabel Herrera to call and request area be evaluated and addressed now.   Also advised to have eye taping added to his plan of c

## 2022-01-04 NOTE — TELEPHONE ENCOUNTER
[facility] should have a provider making round who should examine this area and if infected he will need to be treated for the same.  If this is infected to the point where his face is swollen - I am afraid that this infection is likely spreading

## 2022-01-06 ENCOUNTER — MED REC SCAN ONLY (OUTPATIENT)
Dept: FAMILY MEDICINE CLINIC | Facility: CLINIC | Age: 87
End: 2022-01-06

## 2022-01-10 ENCOUNTER — TELEPHONE (OUTPATIENT)
Dept: FAMILY MEDICINE CLINIC | Facility: CLINIC | Age: 87
End: 2022-01-10

## 2022-01-10 DIAGNOSIS — R53.1 WEAKNESS GENERALIZED: Primary | ICD-10-CM

## 2022-01-10 NOTE — TELEPHONE ENCOUNTER
Daughter states pt needs a referral for PT. Pt is currently at [facility] in [place]. Pt decided to stop radiation for cancer. Please call Jimbo Zheng back.

## 2022-01-10 NOTE — TELEPHONE ENCOUNTER
Looking for a referral for PT. Wants to get his strength back and walking. Currently wheelchair bound. Has been like that since he's had his last radiation treatments. Confirmed has decided to discontinue radiation therapy.   Was in hospital and released

## 2022-01-11 NOTE — TELEPHONE ENCOUNTER
Ok to place PT referral for rehab at [facility] to help with conditioning - start 12/23/2021 - total of 12 sessions recommended at this time. If extension is needed please let us know.  ~ MM

## 2022-01-11 NOTE — TELEPHONE ENCOUNTER
PT referral placed with specifications of below. Faxed to [facility] at [phone]. Dusty Allen advised of this.

## 2022-01-20 ENCOUNTER — TELEPHONE (OUTPATIENT)
Dept: FAMILY MEDICINE CLINIC | Facility: CLINIC | Age: 87
End: 2022-01-20

## 2022-01-20 NOTE — TELEPHONE ENCOUNTER
Dtr went by to see pt at [facility] [address] home - pt was lying in bed  dtr was able to see pt through window and talk to pt over phone    Pt stated he's doing okay - c/o mild runny nose at this time  Dtr reports pt sounded and looked okay - stated she might

## 2022-01-20 NOTE — TELEPHONE ENCOUNTER
Called Ann-Marie mcclure    She reports that she and her sister are concerned about pt; Pt at [address], was just Dx COVID  Pt with Hx cancer, fibrosis, breathing issues    Unable to visit due to \"virus going around\" at facility    She reports Last time

## 2022-01-24 ENCOUNTER — TELEPHONE (OUTPATIENT)
Dept: FAMILY MEDICINE CLINIC | Facility: CLINIC | Age: 87
End: 2022-01-24

## 2022-01-24 NOTE — TELEPHONE ENCOUNTER
Dima Ayoub from [facility] [facility] calling would like to know if order for pt to obtain hospital bed has been signed by     Call back # [phone]   Fax # [phone]    Thank you

## 2022-01-24 NOTE — TELEPHONE ENCOUNTER
Tried to call [facility] the office is closed need to clarify why this is needed.  The thought is that the pt is at [facility] rehab

## 2022-01-27 NOTE — TELEPHONE ENCOUNTER
Called [facility] - advised that pt currently at [facility]    Staff reported that original order request was from [facility] bed not needed at this time    No further actions required.

## 2022-01-29 NOTE — TELEPHONE ENCOUNTER
Received fax from [address] [place] [address]  regarding DME equipment order    Called and spoke with pt's dtr Renetta Lowe  Confirmed that hospital bed NOT needed. She states that this was sent prior to pt going to [facility] facility.

## 2022-06-08 ENCOUNTER — TELEPHONE (OUTPATIENT)
Dept: FAMILY MEDICINE CLINIC | Facility: CLINIC | Age: 87
End: 2022-06-08
